# Patient Record
Sex: FEMALE | Race: WHITE | NOT HISPANIC OR LATINO | Employment: PART TIME | URBAN - METROPOLITAN AREA
[De-identification: names, ages, dates, MRNs, and addresses within clinical notes are randomized per-mention and may not be internally consistent; named-entity substitution may affect disease eponyms.]

---

## 2017-01-10 ENCOUNTER — TRANSCRIBE ORDERS (OUTPATIENT)
Dept: ADMINISTRATIVE | Facility: HOSPITAL | Age: 62
End: 2017-01-10

## 2017-01-10 DIAGNOSIS — F17.200 SMOKER: Primary | ICD-10-CM

## 2017-01-18 ENCOUNTER — HOSPITAL ENCOUNTER (OUTPATIENT)
Dept: CT IMAGING | Facility: HOSPITAL | Age: 62
Discharge: HOME | End: 2017-01-18
Payer: COMMERCIAL

## 2017-01-18 DIAGNOSIS — F17.200 SMOKER: ICD-10-CM

## 2018-01-29 ENCOUNTER — OFFICE VISIT (OUTPATIENT)
Dept: OBGYN CLINIC | Facility: CLINIC | Age: 63
End: 2018-01-29
Payer: COMMERCIAL

## 2018-01-29 VITALS
WEIGHT: 154 LBS | SYSTOLIC BLOOD PRESSURE: 120 MMHG | BODY MASS INDEX: 24.75 KG/M2 | HEIGHT: 66 IN | DIASTOLIC BLOOD PRESSURE: 80 MMHG

## 2018-01-29 DIAGNOSIS — Z01.419 ENCOUNTER FOR ANNUAL ROUTINE GYNECOLOGICAL EXAMINATION: ICD-10-CM

## 2018-01-29 DIAGNOSIS — Z01.419 ENCOUNTER FOR ANNUAL ROUTINE GYNECOLOGICAL EXAMINATION: Primary | ICD-10-CM

## 2018-01-29 PROCEDURE — G0145 SCR C/V CYTO,THINLAYER,RESCR: HCPCS | Performed by: OBSTETRICS & GYNECOLOGY

## 2018-01-29 PROCEDURE — S0612 ANNUAL GYNECOLOGICAL EXAMINA: HCPCS | Performed by: OBSTETRICS & GYNECOLOGY

## 2018-01-29 PROCEDURE — 87624 HPV HI-RISK TYP POOLED RSLT: CPT | Performed by: OBSTETRICS & GYNECOLOGY

## 2018-01-29 RX ORDER — ALPRAZOLAM 0.25 MG/1
1 TABLET ORAL
COMMUNITY
Start: 2012-10-02

## 2018-01-29 RX ORDER — BUPROPION HYDROCHLORIDE 150 MG/1
1 TABLET ORAL
COMMUNITY
Start: 2011-09-19

## 2018-01-29 RX ORDER — ALENDRONATE SODIUM 70 MG/1
1 TABLET ORAL WEEKLY
COMMUNITY
Start: 2012-05-22

## 2018-01-29 NOTE — PROGRESS NOTES
Assessment/Plan:    Pap smear done with HPV  Encouraged self-breast examination as well as calcium supplementation  Recommend annual mammogram, discussed 3D mammography given breast density  She will check to see if this is covered by her insurance  She will also make arrangements for screening colonoscopy  She will continue to follow-up with her primary care physician as scheduled  I also recommend couples/individual counseling  Patient is not agreeable to this at this time  Also recommend decreased tobacco use  She will return to office in 1 year or p r n  No problem-specific Assessment & Plan notes found for this encounter  Diagnoses and all orders for this visit:    Encounter for annual routine gynecological examination    Other orders  -     alendronate (FOSAMAX) 70 mg tablet; Take 1 tablet by mouth once a week  -     ALPRAZolam (XANAX) 0 25 mg tablet; Take 1 tablet by mouth  -     buPROPion (WELLBUTRIN XL) 150 mg 24 hr tablet; Take 1 tablet by mouth          Subjective:      Patient ID: Jeremi Laguna is a 63 y o  female  3 para 3 who presents for her annual gyn exam   Her last gyn exam was over 2 years ago  Patient went through menopause at age 54  She was never on hormone replacement  She denies any vaginal bleeding or spotting  She denies any changes in bowel or bladder function  Her last mammogram was over 2 years ago  She also states that she is due for colonoscopy this year  She is not taking any medication on a daily basis  She does have a long history of depression and anxiety  She does follow up with her family physician every 6 months  She has been off her antidepressants for well over 1 year  She has not seen a psychologist or psychiatrist in the last 2 years  Patient has been  for over 10 years and has not been sexually active in 10 years  She does admit to problems with their relationships  He does have a lot of medical issues also      HPI    The following portions of the patient's history were reviewed and updated as appropriate: allergies, current medications, past family history, past medical history, past social history, past surgical history and problem list     Review of Systems   Constitutional: Negative for fatigue, fever and unexpected weight change  Respiratory: Negative for cough, chest tightness, shortness of breath and wheezing  Cardiovascular: Negative  Negative for chest pain and palpitations  Gastrointestinal: Negative  Negative for abdominal distention, abdominal pain, blood in stool, constipation, diarrhea, nausea and vomiting  Genitourinary: Negative  Negative for difficulty urinating, dyspareunia, dysuria, flank pain, frequency, genital sores, hematuria, pelvic pain, urgency, vaginal bleeding, vaginal discharge and vaginal pain  Skin: Negative for rash  Objective:     Physical Exam   Constitutional: She appears well-developed and well-nourished  Cardiovascular: Normal rate, regular rhythm and normal heart sounds  Pulmonary/Chest: Effort normal and breath sounds normal    Abdominal: Soft  Bowel sounds are normal  She exhibits no distension  There is no tenderness  There is no rebound and no guarding  Genitourinary: Rectum normal, vagina normal and uterus normal  No breast swelling, tenderness or discharge  There is no tenderness or lesion on the right labia  There is no tenderness or lesion on the left labia  Cervix exhibits no motion tenderness and no discharge  Right adnexum displays no mass, no tenderness and no fullness  Left adnexum displays no mass, no tenderness and no fullness  No tenderness or bleeding in the vagina  No vaginal discharge found

## 2018-02-02 LAB
HPV RRNA GENITAL QL NAA+PROBE: NORMAL
LAB AP GYN PRIMARY INTERPRETATION: NORMAL
Lab: NORMAL

## 2020-02-17 NOTE — ADDENDUM NOTE
Addended by: Jyoti Cox on: 1/29/2018 03:09 PM     Modules accepted: Orders 10/20/2017 Rhomboid Transposition Flap Text: The defect edges were debeveled with a #15 scalpel blade.  Given the location of the defect and the proximity to free margins a rhomboid transposition flap was deemed most appropriate.  Using a sterile surgical marker, an appropriate rhomboid flap was drawn incorporating the defect.    The area thus outlined was incised deep to adipose tissue with a #15 scalpel blade.  The skin margins were undermined to an appropriate distance in all directions utilizing iris scissors.

## 2021-02-15 ENCOUNTER — TRANSCRIBE ORDERS (OUTPATIENT)
Dept: PHYSICAL THERAPY | Facility: CLINIC | Age: 66
End: 2021-02-15

## 2021-02-23 ENCOUNTER — EVALUATION (OUTPATIENT)
Dept: PHYSICAL THERAPY | Facility: CLINIC | Age: 66
End: 2021-02-23
Payer: COMMERCIAL

## 2021-02-23 ENCOUNTER — TRANSCRIBE ORDERS (OUTPATIENT)
Dept: PHYSICAL THERAPY | Facility: CLINIC | Age: 66
End: 2021-02-23

## 2021-02-23 DIAGNOSIS — M25.511 RIGHT SHOULDER PAIN, UNSPECIFIED CHRONICITY: Primary | ICD-10-CM

## 2021-02-23 PROCEDURE — 97140 MANUAL THERAPY 1/> REGIONS: CPT | Performed by: PHYSICAL THERAPIST

## 2021-02-23 PROCEDURE — 97110 THERAPEUTIC EXERCISES: CPT | Performed by: PHYSICAL THERAPIST

## 2021-02-23 PROCEDURE — 97161 PT EVAL LOW COMPLEX 20 MIN: CPT | Performed by: PHYSICAL THERAPIST

## 2021-02-23 NOTE — LETTER
2021    Pb Morales MD  [address] [facility] [address]    Patient: Forest Agee   YOB: 1955   Date of Visit: 2021     Encounter Diagnosis     ICD-10-CM    1  Right shoulder pain, unspecified chronicity  M25 511        Dear Dr Mcgovern Reading:    Thank you for your recent referral of Forest Agee  Please review the attached evaluation summary from Kendal's recent visit  Please verify that you agree with the plan of care by signing the attached order  If you have any questions or concerns, please do not hesitate to call  I sincerely appreciate the opportunity to share in the care of one of your patients and hope to have another opportunity to work with you in the near future  Sincerely,    Andres Herr, PT      Referring Provider:      I certify that I have read the below Plan of Care and certify the need for these services furnished under this plan of treatment while under my care  Pb Morales MD  [address]  Via Fax: [phone]          PT Evaluation     Today's date: 2021  Patient name: Forest Agee  : 1955  MRN: [id number]  Referring provider: Ralph Sanchez*  Dx:   Encounter Diagnosis     ICD-10-CM    1  Right shoulder pain, unspecified chronicity  M25 511                   Assessment  Assessment details: Forest Agee is a pleasant 63 y o  female presents s/p L GH capsule lysis and manipulation  Pt with significant improvements in PROM since surgery considering her subjective limitations prior  Pain was well managed today  Educated on 30 minute bouts of supine stretching to increase TERT  Educated on working on motion through pain to maintain the improvements seen since surgery  Will initiate AAROM nv  No further referral appears necessary at this time     Skilled PT services appropriate to facilitate return to prior level of function with transition to home exercise program for independent management when appropriate  Impairments: abnormal muscle firing, abnormal muscle tone, abnormal or restricted ROM, impaired physical strength, lacks appropriate home exercise program and pain with function  Understanding of Dx/Px/POC: good   Prognosis: good    Goals  Patient will successfully transition to home exercise program   Patient will be able to manage symptoms independently  LT  foto to predicted in 8 weeks  2  Shoulder P/AROM WFL within 8 weeks  ST  Shoulder flexion ROM to 170* within 3 weeks  2  Shoulder AROM flexion to 120* with appropriate form within 4 weeks  Plan  Patient would benefit from: skilled PT  Referral necessary: No  Planned modality interventions: thermotherapy: hydrocollator packs  Planned therapy interventions: home exercise program, manual therapy, neuromuscular re-education, patient education, functional ROM exercises, strengthening, stretching, joint mobilization, graded activity, graded exercise, therapeutic exercise, body mechanics training, motor coordination training and activity modification  Frequency: 2x week  Duration in weeks: 12  Treatment plan discussed with: patient        Subjective Evaluation    History of Present Illness  Date of onset: 2021  Mechanism of injury: Pt presents 1 day s/p manipulation and capsular release of R shoulder  She reports for 1 year struggling highly with shoulder mobility and pain  She attempted PT for a short time and injections with minimal change in motion so opted for surgery  She does have local numbness surrounding the shoulder still and has taken pain medication prior to PT with sxs better managed  She would like to improve on her shoulder ROM and function  In positions away from her body    Pain  Current pain ratin  At best pain ratin  At worst pain ratin  Quality: dull ache and sharp    Treatments  Current treatment: physical therapy        Objective     Tenderness Additional Tenderness Details  Minimal cervical musculature trigger points/spasm    Neurological Testing     Sensation     Shoulder   Left Shoulder   Intact: light touch    Right Shoulder   Intact: light touch    Additional Neurological Details  Decreased sensation surrounding shoulder and equal b/l from middle third of arm to fingers    Passive Range of Motion   Left Shoulder   Flexion: 150 degrees   External rotation 90°: 70 degrees   Internal rotation 0°: 30 degrees     Additional Passive Range of Motion Details  Mild pain all motions but able to hold in end range for prolonged time frame    General Comments:      Shoulder Comments   AROM elbow limited today to 0-40* with full PROM and did not assess AROM shoulder   strength WFL      Flowsheet Rows      Most Recent Value   PT/OT G-Codes   Current Score  19   Projected Score  56             Precautions: none    HEP: 30min TERT supine exercises ea  Manuals 2/23            Shoulder PROM  CB                                                   Neuro Re-Ed                                                                                                        Ther Ex             TERT supine Flexion  ER w/abd 90* and 45* 10'              Table slides             ER table stretch             pulleys                                                                 Ther Activity                                       Gait Training                                       Modalities

## 2021-02-23 NOTE — PROGRESS NOTES
PT Evaluation     Today's date: 2021  Patient name: Elmo Duncan  : 1955  MRN: [id number]  Referring provider: Roberto Simon*  Dx:   Encounter Diagnosis     ICD-10-CM    1  Right shoulder pain, unspecified chronicity  M25 511                   Assessment  Assessment details: Elmo Duncan is a pleasant 63 y o  female presents s/p L GH capsule lysis and manipulation  Pt with significant improvements in PROM since surgery considering her subjective limitations prior  Pain was well managed today  Educated on 30 minute bouts of supine stretching to increase TERT  Educated on working on motion through pain to maintain the improvements seen since surgery  Will initiate AAROM nv  No further referral appears necessary at this time  Skilled PT services appropriate to facilitate return to prior level of function with transition to home exercise program for independent management when appropriate  Impairments: abnormal muscle firing, abnormal muscle tone, abnormal or restricted ROM, impaired physical strength, lacks appropriate home exercise program and pain with function  Understanding of Dx/Px/POC: good   Prognosis: good    Goals  Patient will successfully transition to home exercise program   Patient will be able to manage symptoms independently  LT  foto to predicted in 8 weeks  2  Shoulder P/AROM WFL within 8 weeks  ST  Shoulder flexion ROM to 170* within 3 weeks  2  Shoulder AROM flexion to 120* with appropriate form within 4 weeks        Plan  Patient would benefit from: skilled PT  Referral necessary: No  Planned modality interventions: thermotherapy: hydrocollator packs  Planned therapy interventions: home exercise program, manual therapy, neuromuscular re-education, patient education, functional ROM exercises, strengthening, stretching, joint mobilization, graded activity, graded exercise, therapeutic exercise, body mechanics training, motor coordination training and activity modification  Frequency: 2x week  Duration in weeks: 12  Treatment plan discussed with: patient        Subjective Evaluation    History of Present Illness  Date of onset: 2021  Mechanism of injury: Pt presents 1 day s/p manipulation and capsular release of R shoulder  She reports for 1 year struggling highly with shoulder mobility and pain  She attempted PT for a short time and injections with minimal change in motion so opted for surgery  She does have local numbness surrounding the shoulder still and has taken pain medication prior to PT with sxs better managed  She would like to improve on her shoulder ROM and function  In positions away from her body    Pain  Current pain ratin  At best pain ratin  At worst pain ratin  Quality: dull ache and sharp    Treatments  Current treatment: physical therapy        Objective     Tenderness     Additional Tenderness Details  Minimal cervical musculature trigger points/spasm    Neurological Testing     Sensation     Shoulder   Left Shoulder   Intact: light touch    Right Shoulder   Intact: light touch    Additional Neurological Details  Decreased sensation surrounding shoulder and equal b/l from middle third of arm to fingers    Passive Range of Motion   Left Shoulder   Flexion: 150 degrees   External rotation 90°: 70 degrees   Internal rotation 0°: 30 degrees     Additional Passive Range of Motion Details  Mild pain all motions but able to hold in end range for prolonged time frame    General Comments:      Shoulder Comments   AROM elbow limited today to 0-40* with full PROM and did not assess AROM shoulder   strength WFL      Flowsheet Rows      Most Recent Value   PT/OT G-Codes   Current Score  19   Projected Score  56             Precautions: none    HEP: 30min TERT supine exercises ea  Manuals             Shoulder PROM  CB                                                   Neuro Re-Ed Ther Ex             TERT supine Flexion  ER w/abd 90* and 45* 10'              Table slides             ER table stretch             pulleys                                                                 Ther Activity                                       Gait Training                                       Modalities

## 2021-02-24 ENCOUNTER — OFFICE VISIT (OUTPATIENT)
Dept: PHYSICAL THERAPY | Facility: CLINIC | Age: 66
End: 2021-02-24
Payer: COMMERCIAL

## 2021-02-24 DIAGNOSIS — M25.511 RIGHT SHOULDER PAIN, UNSPECIFIED CHRONICITY: Primary | ICD-10-CM

## 2021-02-24 PROCEDURE — 97140 MANUAL THERAPY 1/> REGIONS: CPT | Performed by: PHYSICAL THERAPIST

## 2021-02-24 PROCEDURE — 97112 NEUROMUSCULAR REEDUCATION: CPT | Performed by: PHYSICAL THERAPIST

## 2021-02-24 PROCEDURE — 97110 THERAPEUTIC EXERCISES: CPT | Performed by: PHYSICAL THERAPIST

## 2021-02-24 NOTE — PROGRESS NOTES
Daily Note     Today's date: 2021  Patient name: Bridgett Sotelo  : 1955  MRN: [id number]  Referring provider: Melissa Corrales*  Dx:   Encounter Diagnosis     ICD-10-CM    1  Right shoulder pain, unspecified chronicity  M25 511                   Subjective: Reports some soreness but compliance with her HEP  Objective: See treatment diary below      Assessment: Tolerated treatment well  Patient would benefit from continued PT      Plan: Progress treatment as tolerated         Precautions: none    HEP: 30min TERT supine exercises ea  Manuals            Shoulder PROM  CB Nd           STM to axilla  ND           Inferior and posterior  GH joint mobs  ND                        Neuro Re-Ed             Upper thoracic extesion stretch over pillow fold with LTR  5'/  20x           pulleys  10x10"           Supine posterior capsule stretch  5x30"                                                               Ther Ex             TERT supine Flexion  ER w/abd 90* and 45* 10'   home           Table slides  NV           ER table stretch  NV                                                                            Ther Activity                                       Gait Training                                       Modalities

## 2021-02-25 ENCOUNTER — APPOINTMENT (OUTPATIENT)
Dept: PHYSICAL THERAPY | Facility: CLINIC | Age: 66
End: 2021-02-25
Payer: COMMERCIAL

## 2021-02-26 ENCOUNTER — OFFICE VISIT (OUTPATIENT)
Dept: PHYSICAL THERAPY | Facility: CLINIC | Age: 66
End: 2021-02-26
Payer: COMMERCIAL

## 2021-02-26 DIAGNOSIS — M25.511 RIGHT SHOULDER PAIN, UNSPECIFIED CHRONICITY: Primary | ICD-10-CM

## 2021-02-26 PROCEDURE — 97112 NEUROMUSCULAR REEDUCATION: CPT | Performed by: PHYSICAL THERAPIST

## 2021-02-26 PROCEDURE — 97140 MANUAL THERAPY 1/> REGIONS: CPT | Performed by: PHYSICAL THERAPIST

## 2021-02-26 PROCEDURE — 97110 THERAPEUTIC EXERCISES: CPT | Performed by: PHYSICAL THERAPIST

## 2021-03-01 ENCOUNTER — OFFICE VISIT (OUTPATIENT)
Dept: PHYSICAL THERAPY | Facility: CLINIC | Age: 66
End: 2021-03-01
Payer: COMMERCIAL

## 2021-03-01 DIAGNOSIS — M25.511 RIGHT SHOULDER PAIN, UNSPECIFIED CHRONICITY: Primary | ICD-10-CM

## 2021-03-01 PROCEDURE — 97110 THERAPEUTIC EXERCISES: CPT | Performed by: PHYSICAL THERAPIST

## 2021-03-01 PROCEDURE — 97112 NEUROMUSCULAR REEDUCATION: CPT | Performed by: PHYSICAL THERAPIST

## 2021-03-01 PROCEDURE — 97140 MANUAL THERAPY 1/> REGIONS: CPT | Performed by: PHYSICAL THERAPIST

## 2021-03-01 NOTE — PROGRESS NOTES
Daily Note     Today's date: 3/1/2021  Patient name: Titus Mathur  : 1955  MRN: [id number]  Referring provider: Edenilson Carlos*  Dx:   Encounter Diagnosis     ICD-10-CM    1  Right shoulder pain, unspecified chronicity  M25 511                   Subjective: Reports that Naproxen has been helping      Objective: See treatment diary below      Assessment: Tolerated treatment well  Patient would benefit from continued PT  Updated Hep and advised to perform 5x per day or more    Plan: Progress treatment as tolerated         Precautions: none      Manuals 2/23 2/24 2/26 3/1         Shoulder PROM  CB Nd ND ND         STM to axilla  ND ND ND         Inferior and posterior  GH joint mobs  ND ND ND         Scapular mobs    ND         Neuro Re-Ed             Upper thoracic extesion stretch over pillow fold with LTR  5'/  20x ND ND f/b AROm wand flexion 10x         pulleys  10x10" ND 20x10"         Supine posterior capsule stretch  5x30" ND          Table slide flexion   20x10" ND         Table slide ER   20x10" ND                                   Ther Ex             TERT supine Flexion  ER w/abd 90* and 45* 10'   home                                                                                                      Ther Activity                                       Gait Training                                       Modalities

## 2021-03-02 ENCOUNTER — OFFICE VISIT (OUTPATIENT)
Dept: PHYSICAL THERAPY | Facility: CLINIC | Age: 66
End: 2021-03-02
Payer: COMMERCIAL

## 2021-03-02 DIAGNOSIS — M25.511 RIGHT SHOULDER PAIN, UNSPECIFIED CHRONICITY: Primary | ICD-10-CM

## 2021-03-02 PROCEDURE — 97112 NEUROMUSCULAR REEDUCATION: CPT | Performed by: PHYSICAL THERAPIST

## 2021-03-02 PROCEDURE — 97140 MANUAL THERAPY 1/> REGIONS: CPT | Performed by: PHYSICAL THERAPIST

## 2021-03-02 PROCEDURE — 97110 THERAPEUTIC EXERCISES: CPT | Performed by: PHYSICAL THERAPIST

## 2021-03-02 NOTE — PROGRESS NOTES
Daily Note     Today's date: 3/2/2021  Patient name: Gisela Haskins  : 1955  MRN: [id number]  Referring provider: Buzz Goldmann*  Dx:   Encounter Diagnosis     ICD-10-CM    1  Right shoulder pain, unspecified chronicity  M25 511                   Subjective: Reports that her shoulder mobility is improving and she felt much better after yesterday's session  Objective: See treatment diary below    Assessment: Tolerated treatment well  Patient would benefit from continued PT  Ismael Zhu Plan: Progress treatment as tolerated         Precautions: none      Manuals 2/23 2/24 2/26 3/1 3/2        Shoulder PROM  CB Nd ND ND ND        STM to axilla  ND ND ND ND        Inferior and posterior  GH joint mobs  ND ND ND ND        Scapular mobs    ND ND        Neuro Re-Ed             Upper thoracic extesion stretch over pillow fold with LTR  5'/  20x ND ND f/b AROm wand flexion 10x ND        pulleys  10x10" ND 20x10" ND        Supine posterior capsule stretch  5x30" ND          Table slide flexion   20x10" ND ND        Table slide ER   20x10" ND ND                                  Ther Ex             TERT supine Flexion  ER w/abd 90* and 45* 10'   home                                                                                                      Ther Activity                                       Gait Training                                       Modalities

## 2021-03-04 ENCOUNTER — OFFICE VISIT (OUTPATIENT)
Dept: PHYSICAL THERAPY | Facility: CLINIC | Age: 66
End: 2021-03-04
Payer: COMMERCIAL

## 2021-03-04 DIAGNOSIS — M25.511 RIGHT SHOULDER PAIN, UNSPECIFIED CHRONICITY: Primary | ICD-10-CM

## 2021-03-04 PROCEDURE — 97110 THERAPEUTIC EXERCISES: CPT | Performed by: PHYSICAL THERAPIST

## 2021-03-04 PROCEDURE — 97140 MANUAL THERAPY 1/> REGIONS: CPT | Performed by: PHYSICAL THERAPIST

## 2021-03-04 PROCEDURE — 97112 NEUROMUSCULAR REEDUCATION: CPT | Performed by: PHYSICAL THERAPIST

## 2021-03-04 NOTE — PROGRESS NOTES
Daily Note     Today's date: 3/4/2021  Patient name: Elmo Duncan  : 1955  MRN: [id number]  Referring provider: Roberto Simon*  Dx:   Encounter Diagnosis     ICD-10-CM    1  Right shoulder pain, unspecified chronicity  M25 511                   Subjective: Reports increased soreness after has MD appointment  MD recommended continued PT 3x per week and will follow up in 4 weeks    Objective: See treatment diary below    Assessment: Tolerated treatment well  Patient would benefit from continued PT  Improved AROM functional IR post tx  She is having pain with eccentric shoulder elevation  Plan: Progress treatment as tolerated         Precautions: none      Manuals 2/23 2/24 2/26 3/1 3/2 3/4       Shoulder PROM  CB Nd ND ND ND ND       STM to axilla  ND ND ND ND ND       Inferior and posterior  GH joint mobs  ND ND ND ND ND       Scapular mobs    ND ND ND       Neuro Re-Ed             Upper thoracic extesion stretch over pillow fold with LTR  5'/  20x ND ND f/b AROm wand flexion 10x ND ND 20x       pulleys  10x10" ND 20x10" ND home       Supine posterior capsule stretch  5x30" ND          Table slide flexion   20x10" ND ND ND       Table slide ER   20x10" ND ND ND       Wall ER stretch      5x30"                    Ther Ex             TERT supine Flexion  ER w/abd 90* and 45* 10'   home                                                                                                      Ther Activity                                       Gait Training                                       Modalities

## 2021-03-08 ENCOUNTER — OFFICE VISIT (OUTPATIENT)
Dept: PHYSICAL THERAPY | Facility: CLINIC | Age: 66
End: 2021-03-08
Payer: COMMERCIAL

## 2021-03-08 DIAGNOSIS — M25.511 RIGHT SHOULDER PAIN, UNSPECIFIED CHRONICITY: Primary | ICD-10-CM

## 2021-03-08 PROCEDURE — 97112 NEUROMUSCULAR REEDUCATION: CPT | Performed by: PHYSICAL THERAPIST

## 2021-03-08 PROCEDURE — 97110 THERAPEUTIC EXERCISES: CPT | Performed by: PHYSICAL THERAPIST

## 2021-03-08 PROCEDURE — 97140 MANUAL THERAPY 1/> REGIONS: CPT | Performed by: PHYSICAL THERAPIST

## 2021-03-08 NOTE — PROGRESS NOTES
Daily Note     Today's date: 3/8/2021  Patient name: Maren Cruz  : 1955  MRN: [id number]  Referring provider: Hyun Gutierrez*  Dx:   Encounter Diagnosis     ICD-10-CM    1  Right shoulder pain, unspecified chronicity  M25 511                   Subjective: Reports continued soreness/pain in right shoulder  States she has been taking naproxen and oxycodone to reduce symptoms  Objective: See treatment diary below  Assessment: Tolerated treatment well  Cont'd restriction in R shoulder capsule most notably with inferior/posterior GH mobilizations  Decreased pain and improved ROM noted post-tx  Patient would benefit from continued PT  Plan: Progress treatment as tolerated         Precautions: none      Manuals 2/23 2/24 2/26 3/1 3/2 3/4 3/8      Shoulder PROM  CB Nd ND ND ND ND KF      STM to axilla  ND ND ND ND ND KF      Inferior and posterior  GH joint mobs  ND ND ND ND ND KF      Scapular mobs    ND ND ND KF      Neuro Re-Ed             Upper thoracic extesion stretch over pillow fold with LTR  5'/  20x ND ND f/b AROm wand flexion 10x ND ND 20x KF      pulleys  10x10" ND 20x10" ND home home      Supine posterior capsule stretch  5x30" ND          Table slide flexion   20x10" ND ND ND KF      Table slide ER   20x10" ND ND ND KF      Wall ER stretch      5x30" :30x5      inferior mobs with belt and AAROM OH        NV                                Ther Ex             TERT supine Flexion  ER w/abd 90* and 45* 10'   home                                                                                                      Ther Activity                                       Gait Training                                       Modalities

## 2021-03-10 ENCOUNTER — OFFICE VISIT (OUTPATIENT)
Dept: PHYSICAL THERAPY | Facility: CLINIC | Age: 66
End: 2021-03-10
Payer: COMMERCIAL

## 2021-03-10 DIAGNOSIS — M25.511 RIGHT SHOULDER PAIN, UNSPECIFIED CHRONICITY: Primary | ICD-10-CM

## 2021-03-10 PROCEDURE — 97140 MANUAL THERAPY 1/> REGIONS: CPT | Performed by: PHYSICAL THERAPIST

## 2021-03-10 PROCEDURE — 97112 NEUROMUSCULAR REEDUCATION: CPT | Performed by: PHYSICAL THERAPIST

## 2021-03-10 PROCEDURE — 97110 THERAPEUTIC EXERCISES: CPT | Performed by: PHYSICAL THERAPIST

## 2021-03-10 NOTE — PROGRESS NOTES
Daily Note     Today's date: 3/10/2021  Patient name: Sophie Bowman  : 1955  MRN: [id number]  Referring provider: Marcio Child*  Dx:   Encounter Diagnosis     ICD-10-CM    1  Right shoulder pain, unspecified chronicity  M25 511                   Subjective: Reports feeling much better with ROM  No increased pain reported after performing cleaning at her house yesterday    Objective: See treatment diary below  Assessment: Tolerated treatment well  Patient would benefit from continued PT  Plan: Progress treatment as tolerated         Precautions: none      Manuals 2/23 2/24 2/26 3/1 3/2 3/4 3/8 3/10     Shoulder PROM  CB Nd ND ND ND ND KF ND     STM to axilla  ND ND ND ND ND KF ND     Inferior and posterior  GH joint mobs  ND ND ND ND ND KF ND     Scapular mobs    ND ND ND KF ND     Neuro Re-Ed             Upper thoracic extesion stretch over pillow fold with LTR  5'/  20x ND ND f/b AROm wand flexion 10x ND ND 20x KF ND     pulleys  10x10" ND 20x10" ND home home ND     Supine posterior capsule stretch  5x30" ND          Table slide flexion   20x10" ND ND ND KF ND     Table slide ER   20x10" ND ND ND KF ND     Wall ER stretch      5x30" :30x5      inferior mobs with belt and AAROM OH        NV NV                               Ther Ex             TERT supine Flexion  ER w/abd 90* and 45* 10'   home           scaption with scap setting        20x     S/l ER DB        2lbs 20x     tband ext and add        Green 10x10"                                                         Ther Activity                                       Gait Training                                       Modalities

## 2021-03-11 ENCOUNTER — OFFICE VISIT (OUTPATIENT)
Dept: PHYSICAL THERAPY | Facility: CLINIC | Age: 66
End: 2021-03-11
Payer: COMMERCIAL

## 2021-03-11 DIAGNOSIS — M25.511 RIGHT SHOULDER PAIN, UNSPECIFIED CHRONICITY: Primary | ICD-10-CM

## 2021-03-11 PROCEDURE — 97112 NEUROMUSCULAR REEDUCATION: CPT | Performed by: PHYSICAL THERAPIST

## 2021-03-11 PROCEDURE — 97110 THERAPEUTIC EXERCISES: CPT | Performed by: PHYSICAL THERAPIST

## 2021-03-11 PROCEDURE — 97140 MANUAL THERAPY 1/> REGIONS: CPT | Performed by: PHYSICAL THERAPIST

## 2021-03-11 NOTE — PROGRESS NOTES
Daily Note     Today's date: 3/11/2021  Patient name: Felix Nance  : 1955  MRN: [id number]  Referring provider: Kristofer Vega*  Dx:   Encounter Diagnosis     ICD-10-CM    1  Right shoulder pain, unspecified chronicity  M25 511                   Subjective: Reports returned to PCP and was prescribed two different pain medications to help manage symptoms  States no adverse effects to PT session progression yesterday, stating difficulty with seated AROM ex  Reports she continues to notice improvements in range of motion overall and is pleased with progress thus far  Objective: See treatment diary below  Assessment: Tolerated treatment well  Good tolerance to manuals with capsular mobility gradually improving  Good tolerance to current ex program, less difficulty reported with seated scaption ex this visit  Patient would benefit from continued PT  Plan: Progress treatment as tolerated         Precautions: none      Manuals 2/23 2/24 2/26 3/1 3/2 3/4 3/8 3/10 3/11    Shoulder PROM  CB Nd ND ND ND ND KF ND KF    STM to axilla  ND ND ND ND ND KF ND KF    Inferior and posterior  GH joint mobs  ND ND ND ND ND KF ND KF    Scapular mobs    ND ND ND KF ND KF    Neuro Re-Ed             Upper thoracic extesion stretch over pillow fold with LTR  5'/  20x ND ND f/b AROm wand flexion 10x ND ND 20x KF ND KF    pulleys  10x10" ND 20x10" ND home home ND KF    Supine posterior capsule stretch  5x30" ND          Table slide flexion   20x10" ND ND ND KF ND KF    Table slide ER   20x10" ND ND ND KF ND KF    Wall ER stretch      5x30" :30x5      inferior mobs with belt and AAROM OH        NV NV NV                              Ther Ex             TERT supine Flexion  ER w/abd 90* and 45* 10'   home           scaption with scap setting        20x :05x20    S/l ER DB        2lbs 20x 2lbs :05x15    tband ext and add        Green 10x10" Green 10x10"                                                        Ther Activity                                       Gait Training                                       Modalities

## 2021-03-15 ENCOUNTER — OFFICE VISIT (OUTPATIENT)
Dept: PHYSICAL THERAPY | Facility: CLINIC | Age: 66
End: 2021-03-15
Payer: COMMERCIAL

## 2021-03-15 DIAGNOSIS — M25.511 RIGHT SHOULDER PAIN, UNSPECIFIED CHRONICITY: Primary | ICD-10-CM

## 2021-03-15 PROCEDURE — 97140 MANUAL THERAPY 1/> REGIONS: CPT | Performed by: PHYSICAL THERAPIST

## 2021-03-15 PROCEDURE — 97112 NEUROMUSCULAR REEDUCATION: CPT | Performed by: PHYSICAL THERAPIST

## 2021-03-15 PROCEDURE — 97110 THERAPEUTIC EXERCISES: CPT | Performed by: PHYSICAL THERAPIST

## 2021-03-15 NOTE — PROGRESS NOTES
Daily Note     Today's date: 3/15/2021  Patient name: Dai Peguero  : 1955  MRN: [id number]  Referring provider: Naomy Ayala*  Dx:   Encounter Diagnosis     ICD-10-CM    1  Right shoulder pain, unspecified chronicity  M25 511                   Subjective: Reports continued pain despite improve ROM  Objective: See treatment diary below  Assessment: Tolerated treatment well  Patient would benefit from continued PT  Plan: Progress treatment as tolerated         Precautions: none      Manuals 2/23 2/24 2/26 3/1 3/2 3/4 3/8 3/10 3/11 3/15   Shoulder PROM  CB Nd ND ND ND ND KF ND KF ND   STM to axilla  ND ND ND ND ND KF ND KF Nd   Inferior and posterior  GH joint mobs  ND ND ND ND ND KF ND KF ND   Scapular mobs    ND ND ND KF ND KF ND   Neuro Re-Ed             Upper thoracic extesion stretch over pillow fold with LTR  5'/  20x ND ND f/b AROm wand flexion 10x ND ND 20x KF ND KF ND   pulleys  10x10" ND 20x10" ND home home ND KF ND   Supine posterior capsule stretch  5x30" ND          Table slide flexion   20x10" ND ND ND KF ND KF ND   Table slide ER   20x10" ND ND ND KF ND KF ND   Wall ER stretch      5x30" :30x5      inferior mobs with belt and AAROM OH        NV NV NV NV                             Ther Ex             TERT supine Flexion  ER w/abd 90* and 45* 10'   home           scaption with scap setting        20x :05x20 ND   S/l ER DB        2lbs 20x 2lbs :05x15 3lbs 20x   tband ext and add        Green 10x10" Green 10x10" ND   Towel functional IR stretch          3x30"                                          Ther Activity                                       Gait Training                                       Modalities

## 2021-03-17 ENCOUNTER — OFFICE VISIT (OUTPATIENT)
Dept: PHYSICAL THERAPY | Facility: CLINIC | Age: 66
End: 2021-03-17
Payer: COMMERCIAL

## 2021-03-17 DIAGNOSIS — M25.511 RIGHT SHOULDER PAIN, UNSPECIFIED CHRONICITY: Primary | ICD-10-CM

## 2021-03-17 PROCEDURE — 97110 THERAPEUTIC EXERCISES: CPT | Performed by: PHYSICAL THERAPIST

## 2021-03-17 PROCEDURE — 97140 MANUAL THERAPY 1/> REGIONS: CPT | Performed by: PHYSICAL THERAPIST

## 2021-03-17 PROCEDURE — 97112 NEUROMUSCULAR REEDUCATION: CPT | Performed by: PHYSICAL THERAPIST

## 2021-03-17 NOTE — PROGRESS NOTES
Daily Note     Today's date: 3/17/2021  Patient name: Dai Peguero  : 1955  MRN: [id number]  Referring provider: Naomy Ayala*  Dx:   Encounter Diagnosis     ICD-10-CM    1  Right shoulder pain, unspecified chronicity  M25 511                   Subjective: Kendal reports significant increase in mobility and decrease in pain following her last PT session  Objective: See treatment diary below      Assessment: Tolerated treatment well and continues to demonstrate increased mobility following MT   exercises today focused on low trap activation to reduce shoudler hiking during active shoulder flexion    Patient demonstrated fatigue post treatment, exhibited good technique with therapeutic exercises and would benefit from continued PT      Plan: Continue per plan of care  Progress treatment as tolerated         Precautions: none      Manuals 3/17   3/1 3/2 3/4 3/8 3/10 3/11 3/15   Shoulder PROM  JL   ND ND ND KF ND KF ND   STM to axilla JL   ND ND ND KF ND KF Nd   Inferior and posterior  GH joint mobs JL   ND ND ND KF ND KF ND   Scapular mobs JL   ND ND ND KF ND KF ND   Neuro Re-Ed             Upper thoracic extesion stretch over pillow fold with LTR JL   ND f/b AROm wand flexion 10x ND ND 20x KF ND KF ND   pulleys JL   20x10" ND home home ND KF ND   Supine posterior capsule stretch             Table slide flexion    ND ND ND KF ND KF ND   Table slide ER    ND ND ND KF ND KF ND   Wall ER stretch      5x30" :30x5      inferior mobs with belt and AAROM OH        NV NV NV NV   Wall subscap stretch OH JL            Seated low trap dip JL            Ther Ex             TERT supine             scaption with scap setting        20x :05x20 ND   S/l ER DB        2lbs 20x 2lbs :05x15 3lbs 20x   tband ext and add JL       Green 10x10" Green 10x10" ND   Towel functional IR stretch JL         3x30"                                          Ther Activity                                       Gait Training Modalities

## 2021-03-19 ENCOUNTER — OFFICE VISIT (OUTPATIENT)
Dept: PHYSICAL THERAPY | Facility: CLINIC | Age: 66
End: 2021-03-19
Payer: COMMERCIAL

## 2021-03-19 DIAGNOSIS — M25.511 RIGHT SHOULDER PAIN, UNSPECIFIED CHRONICITY: Primary | ICD-10-CM

## 2021-03-19 PROCEDURE — 97140 MANUAL THERAPY 1/> REGIONS: CPT | Performed by: PHYSICAL THERAPIST

## 2021-03-19 PROCEDURE — 97112 NEUROMUSCULAR REEDUCATION: CPT | Performed by: PHYSICAL THERAPIST

## 2021-03-19 PROCEDURE — 97110 THERAPEUTIC EXERCISES: CPT | Performed by: PHYSICAL THERAPIST

## 2021-03-19 NOTE — PROGRESS NOTES
Daily Note     Today's date: 3/19/2021  Patient name: Raymundo Murillo  : 1955  MRN: [id number]  Referring provider: Osiris Hernández*  Dx:   Encounter Diagnosis     ICD-10-CM    1  Right shoulder pain, unspecified chronicity  M25 511                   Subjective: Reports improved ROM but increased soreness  Objective: See treatment diary below      Assessment: She is demonstrating improved ROM, flexibility and strength each week  Plan: Continue per plan of care  Progress treatment as tolerated         Precautions: none      Manuals 3/17 3/19  3/1 3/2 3/4 3/8 3/10 3/11 3/15   Shoulder PROM  JL Nd  ND ND ND KF ND KF ND   STM to axilla JL Nd  ND ND ND KF ND KF Nd   Inferior and posterior  GH joint mobs JL Nd  ND ND ND KF ND KF ND   Scapular mobs JL ND  ND ND ND KF ND KF ND   Neuro Re-Ed             Upper thoracic extesion stretch over pillow fold with LTR JL ND  ND f/b AROm wand flexion 10x ND ND 20x KF ND KF ND   pulleys JL ND  20x10" ND home home ND KF ND   Supine posterior capsule stretch             Table slide flexion  Nd  ND ND ND KF ND KF ND   Table slide ER  ND  ND ND ND KF ND KF ND   Wall ER stretch      5x30" :30x5      inferior mobs with belt and AAROM OH        NV NV NV NV   Wall subscap stretch OH JL            Seated low trap dip JL            Ther Ex             TERT supine             scaption with scap setting  ND      20x :05x20 ND   S/l ER DB  ND      2lbs 20x 2lbs :05x15 3lbs 20x   tband ext and add JL Nd      Green 10x10" Green 10x10" ND   Towel functional IR stretch JL ND        3x30"                                          Ther Activity                                       Gait Training                                       Modalities

## 2021-03-22 ENCOUNTER — OFFICE VISIT (OUTPATIENT)
Dept: PHYSICAL THERAPY | Facility: CLINIC | Age: 66
End: 2021-03-22
Payer: COMMERCIAL

## 2021-03-22 DIAGNOSIS — M25.511 RIGHT SHOULDER PAIN, UNSPECIFIED CHRONICITY: Primary | ICD-10-CM

## 2021-03-22 PROCEDURE — 97112 NEUROMUSCULAR REEDUCATION: CPT | Performed by: PHYSICAL THERAPIST

## 2021-03-22 PROCEDURE — 97110 THERAPEUTIC EXERCISES: CPT | Performed by: PHYSICAL THERAPIST

## 2021-03-22 PROCEDURE — 97140 MANUAL THERAPY 1/> REGIONS: CPT | Performed by: PHYSICAL THERAPIST

## 2021-03-22 NOTE — PROGRESS NOTES
Daily Note     Today's date: 3/22/2021  Patient name: Monique Warren  : 1955  MRN: [id number]  Referring provider: Alyssia Chadwick*  Dx:   Encounter Diagnosis     ICD-10-CM    1  Right shoulder pain, unspecified chronicity  M25 511                   Subjective: Reports increased lateral upper arm discomfort  Reports doing yardwork, raking and cleaning this weekend  Objective: See treatment diary below      Assessment: She is demonstrating improved ROM, flexibility and strength each week  Plan: Continue per plan of care  Progress treatment as tolerated         Precautions: none      Manuals 3/17 3/19 3/22          Shoulder PROM  JL Nd ND          STM to axilla JL Nd Nd          Inferior and posterior  GH joint mobs JL Nd ND          Scapular mobs JL ND ND          Neuro Re-Ed             Upper thoracic extesion stretch over pillow fold with LTR JL ND 5' f/b 10x10"          pulleys JL ND 20x10"          Supine posterior capsule stretch   5x30"          Table slide flexion  Nd 10x10"          Table slide ER  ND 5x20"          Wall ER stretch                          Wall subscap stretch OH JL            Seated low trap dip JL            Ther Ex             TERT supine             scaption with scap setting  ND 10x10"          S/l ER DB  ND ND  3lbs 20x5"          tband ext and add JL Nd Blue 10x10"          Towel functional IR stretch JL ND 10x10"          tband ER    Red 10x10"                                     Ther Activity                                       Gait Training                                       Modalities

## 2021-03-24 ENCOUNTER — OFFICE VISIT (OUTPATIENT)
Dept: PHYSICAL THERAPY | Facility: CLINIC | Age: 66
End: 2021-03-24
Payer: COMMERCIAL

## 2021-03-24 DIAGNOSIS — M25.511 RIGHT SHOULDER PAIN, UNSPECIFIED CHRONICITY: Primary | ICD-10-CM

## 2021-03-24 PROCEDURE — 97140 MANUAL THERAPY 1/> REGIONS: CPT | Performed by: PHYSICAL THERAPIST

## 2021-03-24 PROCEDURE — 97110 THERAPEUTIC EXERCISES: CPT | Performed by: PHYSICAL THERAPIST

## 2021-03-24 PROCEDURE — 97112 NEUROMUSCULAR REEDUCATION: CPT | Performed by: PHYSICAL THERAPIST

## 2021-03-24 NOTE — PROGRESS NOTES
Daily Note     Today's date: 3/24/2021  Patient name: Andrew Rios  : 1955  MRN: [id number]  Referring provider: Emily Riley*  Dx:   Encounter Diagnosis     ICD-10-CM    1  Right shoulder pain, unspecified chronicity  M25 511                   Subjective: Reports feelling much better overall and doing 5 hours of yardwork yesterday  Objective: See treatment diary below      Assessment: She is demonstrating improved ROM, flexibility and strength each week  Plan: Continue per plan of care  Progress treatment as tolerated         Precautions: none      Manuals 3/17 3/19 3/22 3/24         Shoulder PROM  JL Nd ND ND         STM to axilla JL Nd Nd Nd         Inferior and posterior  GH joint mobs JL Nd ND Nd         Scapular mobs JL ND ND Nd         Neuro Re-Ed             Upper thoracic extesion stretch over pillow fold with LTR JL ND 5' f/b 10x10"          pulleys JL ND 20x10" ND         Supine posterior capsule stretch   5x30" ND         Table slide flexion  Nd 10x10" ND         Table slide ER  ND 5x20" ND         Wall ER stretch                          Wall subscap stretch OH JL            Seated low trap dip JL            Ther Ex             TERT supine             scaption with scap setting  ND 10x10" ND         S/l ER DB  ND ND  3lbs 20x5" ND         tband ext and add JL Nd Blue 10x10" ND         Towel functional IR stretch JL ND 10x10" ND         tband ER    Red 10x10"  ND                                   Ther Activity                                       Gait Training                                       Modalities

## 2021-03-26 ENCOUNTER — OFFICE VISIT (OUTPATIENT)
Dept: PHYSICAL THERAPY | Facility: CLINIC | Age: 66
End: 2021-03-26
Payer: COMMERCIAL

## 2021-03-26 DIAGNOSIS — M25.511 RIGHT SHOULDER PAIN, UNSPECIFIED CHRONICITY: Primary | ICD-10-CM

## 2021-03-26 PROCEDURE — 97110 THERAPEUTIC EXERCISES: CPT | Performed by: PHYSICAL THERAPIST

## 2021-03-26 PROCEDURE — 97112 NEUROMUSCULAR REEDUCATION: CPT | Performed by: PHYSICAL THERAPIST

## 2021-03-26 PROCEDURE — 97140 MANUAL THERAPY 1/> REGIONS: CPT | Performed by: PHYSICAL THERAPIST

## 2021-03-26 NOTE — PROGRESS NOTES
Daily Note     Today's date: 3/26/2021  Patient name: Kristin Mosley  : 1955  MRN: [id number]  Referring provider: Wilbert Castaneda*  Dx:   Encounter Diagnosis     ICD-10-CM    1  Right shoulder pain, unspecified chronicity  M25 511                   Subjective: Reports feelling occasional increase in right lateral upper arm pain       Objective: See treatment diary below    Assessment: She is demonstrating efficient scap stabilization with resisted elevation today       Plan: Continue per plan of care  Progress treatment as tolerated         Precautions: none      Manuals 3/17 3/19 3/22 3/24 3/26        Shoulder PROM  JL Nd ND ND ND        STM to axilla JL Nd Nd Nd Nd        Inferior and posterior  GH joint mobs JL Nd ND Nd Nd        Scapular mobs JL ND ND Nd Nd        Neuro Re-Ed             Upper thoracic extesion stretch over pillow fold with LTR JL ND 5' f/b 10x10"  Nd        pulleys JL ND 20x10" ND Nd        Supine posterior capsule stretch   5x30" ND Nd        Table slide flexion  Nd 10x10" ND Nd        Table slide ER  ND 5x20" ND ND        Wall ER stretch                          Wall subscap stretch OH JL            Seated low trap dip JL            Ther Ex             TERT supine             scaption with scap setting  ND 10x10" ND 2lbs 20x        S/l ER DB  ND ND  3lbs 20x5" ND ND        tband ext and add JL Nd Blue 10x10" ND ND        Towel functional IR stretch JL ND 10x10" ND         tband ER    Red 10x10"  ND Green 10x10"        Closed chain serratus punch at EOT     10x10"                     Ther Activity                                       Gait Training                                       Modalities

## 2021-03-29 ENCOUNTER — OFFICE VISIT (OUTPATIENT)
Dept: PHYSICAL THERAPY | Facility: CLINIC | Age: 66
End: 2021-03-29
Payer: COMMERCIAL

## 2021-03-29 DIAGNOSIS — M25.511 RIGHT SHOULDER PAIN, UNSPECIFIED CHRONICITY: Primary | ICD-10-CM

## 2021-03-29 PROCEDURE — 97140 MANUAL THERAPY 1/> REGIONS: CPT | Performed by: PHYSICAL THERAPIST

## 2021-03-29 PROCEDURE — 97110 THERAPEUTIC EXERCISES: CPT | Performed by: PHYSICAL THERAPIST

## 2021-03-29 PROCEDURE — 97112 NEUROMUSCULAR REEDUCATION: CPT | Performed by: PHYSICAL THERAPIST

## 2021-03-29 NOTE — PROGRESS NOTES
Daily Note     Today's date: 3/29/2021  Patient name: Jesus Chandler  : 1955  MRN: [id number]  Referring provider: Kathia Rodríguez*  Dx:   Encounter Diagnosis     ICD-10-CM    1  Right shoulder pain, unspecified chronicity  M25 511                   Subjective: Kendal reports increased anterior shoulder pain today that has been ongoing all weekend  She was busy raking in her yard and baking  Objective: See treatment diary below      Assessment: Tolerated treatment well and session slightly limited due to pain levels today  Education provided on impact of repetitive shoulder movements on inflammation and pain  She does feel better with less pain and improved mobility by end of session  Patient demonstrated fatigue post treatment, exhibited good technique with therapeutic exercises and would benefit from continued PT      Plan: Continue per plan of care  Progress treatment as tolerated         Precautions: none      Manuals 3/17 3/19 3/22 3/24 3/26 3/29       Shoulder PROM  JL Nd ND ND ND JL       STM to axilla JL Nd Nd Nd Nd JL       Inferior and posterior  GH joint mobs JL Nd ND Nd Nd JL       Scapular mobs JL ND ND Nd Nd JL       Neuro Re-Ed             Upper thoracic extesion stretch over pillow fold with LTR JL ND 5' f/b 10x10"  Nd JL       pulleys JL ND 20x10" ND Nd JL       Supine posterior capsule stretch   5x30" ND Nd        Table slide flexion  Nd 10x10" ND Nd JL       Table slide ER  ND 5x20" ND ND JL       Wall ER stretch                          Wall subscap stretch OH JL            Seated low trap dip JL            Ther Ex             TERT supine             scaption with scap setting  ND 10x10" ND 2lbs 20x JL       S/l ER DB  ND ND  3lbs 20x5" ND ND JL       tband ext and add JL Nd Blue 10x10" ND ND JL       Towel functional IR stretch JL ND 10x10" ND         tband ER    Red 10x10"  ND Green 10x10" JL       Closed chain serratus punch at EOT     10x10" 10x10" Ther Activity                                       Gait Training                                       Modalities

## 2021-03-31 ENCOUNTER — OFFICE VISIT (OUTPATIENT)
Dept: PHYSICAL THERAPY | Facility: CLINIC | Age: 66
End: 2021-03-31
Payer: COMMERCIAL

## 2021-03-31 DIAGNOSIS — M25.511 RIGHT SHOULDER PAIN, UNSPECIFIED CHRONICITY: Primary | ICD-10-CM

## 2021-03-31 PROCEDURE — 97140 MANUAL THERAPY 1/> REGIONS: CPT | Performed by: PHYSICAL THERAPIST

## 2021-03-31 PROCEDURE — 97112 NEUROMUSCULAR REEDUCATION: CPT | Performed by: PHYSICAL THERAPIST

## 2021-03-31 NOTE — PROGRESS NOTES
Daily Note     Today's date: 3/31/2021  Patient name: Zeny Light  : 1955  MRN: [id number]  Referring provider: Dulce Delatorre*  Dx:   Encounter Diagnosis     ICD-10-CM    1  Right shoulder pain, unspecified chronicity  M25 511                   Subjective: Ghazal Ramos presents some continued anterior shoulder pain today  She has been avoiding overuse of the arm  Objective: See treatment diary below      Assessment: Tolerated treatment well and able to significantly reduce pain by end of session  Education and cuing provided throughout session on scapular retraction positioning to improve [facility] alignment with overhead movement    Patient demonstrated fatigue post treatment and would benefit from continued PT      Plan: Continue per plan of care  Progress treatment as tolerated         Precautions: none      Manuals 3/17 3/19 3/22 3/24 3/26 3/29 3/31      Shoulder PROM  JL Nd ND ND ND JL JL      STM to axilla JL Nd Nd Nd Nd JL JL      Inferior and posterior  GH joint mobs JL Nd ND Nd Nd JL JL      Scapular mobs JL ND ND Nd Nd JL JL      Neuro Re-Ed             Upper thoracic extesion stretch over pillow fold with LTR JL ND 5' f/b 10x10"  Nd JL       pulleys JL ND 20x10" ND Nd JL JL      Supine posterior capsule stretch   5x30" ND Nd        Table slide flexion  Nd 10x10" ND Nd JL       Table slide ER  ND 5x20" ND ND JL       Wall ER stretch             Wall clocks       RTB 5x      Prone ITY       0# 15x                   Ther Ex             TERT supine             scaption with scap setting  ND 10x10" ND 2lbs 20x JL       S/l ER DB  ND ND  3lbs 20x5" ND ND JL       tband ext and add JL Nd Blue 10x10" ND ND JL       Towel functional IR stretch JL ND 10x10" ND         tband ER    Red 10x10"  ND Green 10x10" JL       Closed chain serratus punch at EOT     10x10" 10x10" nv                   Ther Activity                                       Gait Training Modalities

## 2021-04-02 ENCOUNTER — OFFICE VISIT (OUTPATIENT)
Dept: PHYSICAL THERAPY | Facility: CLINIC | Age: 66
End: 2021-04-02
Payer: COMMERCIAL

## 2021-04-02 DIAGNOSIS — M25.511 RIGHT SHOULDER PAIN, UNSPECIFIED CHRONICITY: Primary | ICD-10-CM

## 2021-04-02 PROCEDURE — 97112 NEUROMUSCULAR REEDUCATION: CPT | Performed by: PHYSICAL THERAPIST

## 2021-04-02 PROCEDURE — 97110 THERAPEUTIC EXERCISES: CPT | Performed by: PHYSICAL THERAPIST

## 2021-04-02 PROCEDURE — 97140 MANUAL THERAPY 1/> REGIONS: CPT | Performed by: PHYSICAL THERAPIST

## 2021-04-02 NOTE — PROGRESS NOTES
Daily Note     Today's date: 2021  Patient name: Mundo Briggs  : 1955  MRN: [id number]  Referring provider: Elida Webb*  Dx:   Encounter Diagnosis     ICD-10-CM    1  Right shoulder pain, unspecified chronicity  M25 511                   Subjective: Kendal reports that her anterior shoulder pain has once again returned  She has been mindful to correct posture at rest and with activity  She notices that her pain tends to return overnight, possibly due to sleeping posture  Objective: See treatment diary below      Assessment: Tolerated treatment well and extensive education provided today on shoulder mechanics and self correction of posture using visual cues in mirror    Patient demonstrated fatigue post treatment and would benefit from continued PT      Plan: Continue per plan of care  Progress treatment as tolerated         Precautions: none      Manuals 3/17 3/19 3/22 3/24 3/26 3/29 3/31 4/2     Shoulder PROM  JL Nd ND ND ND JL JL JL     STM to axilla JL Nd Nd Nd Nd JL JL JL     Inferior and posterior  GH joint mobs JL Nd ND Nd Nd JL JL JL     Scapular mobs JL ND ND Nd Nd JL JL JL     Neuro Re-Ed             Upper thoracic extesion stretch over pillow fold with LTR JL ND 5' f/b 10x10"  Nd JL       pulleys JL ND 20x10" ND Nd JL JL JL     Supine posterior capsule stretch   5x30" ND Nd   JL     Table slide flexion  Nd 10x10" ND Nd JL       Table slide ER  ND 5x20" ND ND JL       Wall ER stretch             Wall clocks       RTB 5x      Prone ITY       0# 15x      Education        15'     Ther Ex             TERT supine             scaption with scap setting  ND 10x10" ND 2lbs 20x JL  JL     S/l ER DB  ND ND  3lbs 20x5" ND ND JL  JL     tband ext and add JL Nd Blue 10x10" ND ND JL  JL     Towel functional IR stretch JL ND 10x10" ND         tband ER    Red 10x10"  ND Green 10x10" JL  JL     Closed chain serratus punch at EOT     10x10" 10x10" nv                   Ther Activity Gait Training                                       Modalities

## 2021-04-05 ENCOUNTER — OFFICE VISIT (OUTPATIENT)
Dept: PHYSICAL THERAPY | Facility: CLINIC | Age: 66
End: 2021-04-05
Payer: COMMERCIAL

## 2021-04-05 DIAGNOSIS — M25.511 RIGHT SHOULDER PAIN, UNSPECIFIED CHRONICITY: Primary | ICD-10-CM

## 2021-04-05 PROCEDURE — 97140 MANUAL THERAPY 1/> REGIONS: CPT | Performed by: PHYSICAL THERAPIST

## 2021-04-05 PROCEDURE — 97112 NEUROMUSCULAR REEDUCATION: CPT | Performed by: PHYSICAL THERAPIST

## 2021-04-05 NOTE — PROGRESS NOTES
Daily Note     Today's date: 2021  Patient name: Cj Mckenzie  : 1955  MRN: [id number]  Referring provider: Carmen Barrera  Dx:   Encounter Diagnosis     ICD-10-CM    1  Right shoulder pain, unspecified chronicity  M25 511                   Subjective: Jerry Davalos had a chiropractor appointment on Saturday, but continues to have return of "grabbing" pain in her shoulder  She continues to be mindful of correcting posture throughout the day  Objective: See treatment diary below      Assessment: Tolerated treatment well and continues to educate on shoulder anatomy and how position impacts impingement symptoms   Emily Farmer Patient exhibited good technique with therapeutic exercises      Plan: Continue per plan of care  MD follow up lateral this week  Anticipate decrease PT treatment to 1x/wk with plan to transition to self management        Precautions: none      Manuals 3/17 3/19 3/22 3/24 3/26 3/29 3/31 4/2 4/5    Shoulder PROM  JL Nd ND ND ND JL JL JL JL    STM to axilla JL Nd Nd Nd Nd JL JL JL JL    Inferior and posterior  GH joint mobs JL Nd ND Nd Nd JL JL JL JL    Scapular mobs JL ND ND Nd Nd JL JL JL JL    Neuro Re-Ed             Upper thoracic extesion stretch over pillow fold with LTR JL ND 5' f/b 10x10"  Nd JL       pulleys JL ND 20x10" ND Nd JL JL JL     Supine posterior capsule stretch   5x30" ND Nd   JL     Table slide flexion  Nd 10x10" ND Nd JL       Table slide ER  ND 5x20" ND ND JL       Wall ER stretch             Wall clocks       RTB 5x      Prone ITY       0# 15x  15x    Education        15' 10'    Ther Ex             TERT supine             scaption with scap setting  ND 10x10" ND 2lbs 20x JL  JL     S/l ER DB  ND ND  3lbs 20x5" ND ND JL  JL     tband ext and add JL Nd Blue 10x10" ND ND JL  JL     Towel functional IR stretch JL ND 10x10" ND         tband ER    Red 10x10"  ND Green 10x10" JL  JL     Closed chain serratus punch at EOT     10x10" 10x10" nv                   Ther Activity                                       Gait Training                                       Modalities

## 2021-04-07 ENCOUNTER — OFFICE VISIT (OUTPATIENT)
Dept: PHYSICAL THERAPY | Facility: CLINIC | Age: 66
End: 2021-04-07
Payer: COMMERCIAL

## 2021-04-07 ENCOUNTER — TRANSCRIBE ORDERS (OUTPATIENT)
Dept: PHYSICAL THERAPY | Facility: CLINIC | Age: 66
End: 2021-04-07

## 2021-04-07 DIAGNOSIS — M25.511 RIGHT SHOULDER PAIN, UNSPECIFIED CHRONICITY: Primary | ICD-10-CM

## 2021-04-07 PROCEDURE — 97140 MANUAL THERAPY 1/> REGIONS: CPT | Performed by: PHYSICAL THERAPIST

## 2021-04-07 NOTE — PROGRESS NOTES
Daily Note     Today's date: 2021  Patient name: Fer Flynn  : 1955  MRN: [id number]  Referring provider: Sivakumar Burk*  Dx:   Encounter Diagnosis     ICD-10-CM    1  Right shoulder pain, unspecified chronicity  M25 511                   Subjective: Nimo Floyd had MD follow up today with additional cortisone injection provided  She was recommended to continue with PT for 4 weeks and return to MD to assess progress  Objective: See treatment diary below      Assessment: Tolerated treatment well and focused on MT today due to recent cortisone injection, she will resume exercises over the weekend    Patient demonstrated fatigue post treatment and would benefit from continued PT      Plan: Continue per plan of care  Progress treatment as tolerated         Precautions: none      Manuals 3/17 3/19 3/22 3/24 3/26 3/29 3/31 4/2 4/5 4/7   Shoulder PROM  JL Nd ND ND ND JL JL JL JL JL   STM to axilla JL Nd Nd Nd Nd JL JL JL JL JL   Inferior and posterior  GH joint mobs JL Nd ND Nd Nd JL JL JL JL JL   Scapular mobs JL ND ND Nd Nd JL JL JL JL JL   Neuro Re-Ed             Upper thoracic extesion stretch over pillow fold with LTR JL ND 5' f/b 10x10"  Nd JL       pulleys JL ND 20x10" ND Nd JL JL JL     Supine posterior capsule stretch   5x30" ND Nd   JL     Table slide flexion  Nd 10x10" ND Nd JL       Table slide ER  ND 5x20" ND ND JL       Wall ER stretch             Wall clocks       RTB 5x      Prone ITY       0# 15x  15x    Education        15' 10'    Ther Ex             TERT supine             scaption with scap setting  ND 10x10" ND 2lbs 20x JL  JL     S/l ER DB  ND ND  3lbs 20x5" ND ND JL  JL     tband ext and add JL Nd Blue 10x10" ND ND JL  JL     Towel functional IR stretch JL ND 10x10" ND         tband ER    Red 10x10"  ND Green 10x10" JL  JL     Closed chain serratus punch at EOT     10x10" 10x10" nv                   Ther Activity                                       Gait Training Modalities

## 2021-04-13 ENCOUNTER — OFFICE VISIT (OUTPATIENT)
Dept: PHYSICAL THERAPY | Facility: CLINIC | Age: 66
End: 2021-04-13
Payer: COMMERCIAL

## 2021-04-13 DIAGNOSIS — M25.511 RIGHT SHOULDER PAIN, UNSPECIFIED CHRONICITY: Primary | ICD-10-CM

## 2021-04-13 PROCEDURE — 97140 MANUAL THERAPY 1/> REGIONS: CPT

## 2021-04-13 NOTE — PROGRESS NOTES
Daily Note     Today's date: 2021  Patient name: Armani Sanchez  : 1955  MRN: [id number]  Referring provider: Norma Fletcher  Dx:   Encounter Diagnosis     ICD-10-CM    1  Right shoulder pain, unspecified chronicity  M25 511                   Subjective: Kendal reports going for massage with decrease pain and improved mobility right shoulder  Pt compliant with HEP  Objective: See treatment diary below      Assessment: Tolerated treatment well  Right shoulder A/PROM cont's to improve slowly  Pt has good understanding of HEP  Patient would benefit from continued PT  Plan: Continue per plan of care        Precautions: none      Manuals 4/13 3/19 3/22 3/24 3/26 3/29 3/31 4/2 4/5 4/7   Shoulder PROM  GH Nd ND ND ND JL JL JL JL JL   STM to axilla [address] Nd Nd Nd Nd JL JL JL JL JL   Inferior and posterior  [address] joint mobs  Nd ND Nd Nd JL JL JL JL JL   Scapular mobs  GH  ND ND Nd Nd JL JL JL JL JL   Neuro Re-Ed             Upper thoracic extesion stretch over pillow fold with LTR GH ND 5' f/b 10x10"  Nd JL       pulleys  ND 20x10" ND Nd JL JL JL     Supine posterior capsule stretch   5x30" ND Nd   JL     Table slide flexion  Nd 10x10" ND Nd JL       Table slide ER  ND 5x20" ND ND JL       Wall ER stretch             Wall clocks       RTB 5x      Prone ITY       0# 15x  15x    Education        15' 10'    Ther Ex             TERT supine             scaption with scap setting  ND 10x10" ND 2lbs 20x JL  JL     S/l ER DB  ND ND  3lbs 20x5" ND ND JL  JL     tband ext and add  Nd Blue 10x10" ND ND JL  JL     Towel functional IR stretch  ND 10x10" ND         tband ER    Red 10x10"  ND Green 10x10" JL  JL     Closed chain serratus punch at EOT     10x10" 10x10" nv                   Ther Activity                                       Gait Training                                       Modalities

## 2021-04-14 ENCOUNTER — APPOINTMENT (OUTPATIENT)
Dept: PHYSICAL THERAPY | Facility: CLINIC | Age: 66
End: 2021-04-14
Payer: COMMERCIAL

## 2021-04-16 ENCOUNTER — OFFICE VISIT (OUTPATIENT)
Dept: PHYSICAL THERAPY | Facility: CLINIC | Age: 66
End: 2021-04-16
Payer: COMMERCIAL

## 2021-04-16 DIAGNOSIS — M25.511 RIGHT SHOULDER PAIN, UNSPECIFIED CHRONICITY: Primary | ICD-10-CM

## 2021-04-16 PROCEDURE — 97112 NEUROMUSCULAR REEDUCATION: CPT | Performed by: PHYSICAL THERAPIST

## 2021-04-16 NOTE — PROGRESS NOTES
Daily Note     Today's date: 2021  Patient name: Alfredo Alpers  : 1955  MRN: [id number]  Referring provider: Wale Schrader  Dx:   Encounter Diagnosis     ICD-10-CM    1  Right shoulder pain, unspecified chronicity  M25 511                   Subjective: Kendal reports continued shoulder soreness  She attends chiropractor and massage therapist 1x/wk outside of PT  She has been mowing grass and staying busy with yard work against the advice of MD       Objective: See treatment diary below      Assessment: Tolerated treatment well and continued to focus on MT during therapy with exercises performed at home    Patient would benefit from continued PT      Plan: Continue per plan of care        Precautions: none      Manuals 4/13 4/16 3/22 3/24 3/26 3/29 3/31 4/2 4/5 4/7   Shoulder PROM  GH JL ND ND ND JL JL JL JL JL   STM to axilla GH JL Nd Nd Nd JL JL JL JL JL   Inferior and posterior  GH joint mobs  JL ND Nd Nd JL JL JL JL JL   Scapular mobs  GH  JL ND Nd Nd JL JL JL JL JL   Neuro Re-Ed             Upper thoracic extesion stretch over pillow fold with LTR GH  5' f/b 10x10"  Nd JL       pulleys   20x10" ND Nd JL JL JL     Supine posterior capsule stretch   5x30" ND Nd   JL     Table slide flexion   10x10" ND Nd JL       Table slide ER   5x20" ND ND JL       Wall ER stretch             Wall clocks       RTB 5x      Prone ITY       0# 15x  15x    Education        15' 10'    Ther Ex             TERT supine             scaption with scap setting   10x10" ND 2lbs 20x JL  JL     S/l ER DB   ND  3lbs 20x5" ND ND JL  JL     tband ext and add   Blue 10x10" ND ND JL  JL     Towel functional IR stretch   10x10" ND         tband ER    Red 10x10"  ND Green 10x10" JL  JL     Closed chain serratus punch at EOT     10x10" 10x10" nv                   Ther Activity                                       Gait Training                                       Modalities

## 2021-04-20 ENCOUNTER — OFFICE VISIT (OUTPATIENT)
Dept: PHYSICAL THERAPY | Facility: CLINIC | Age: 66
End: 2021-04-20
Payer: COMMERCIAL

## 2021-04-20 DIAGNOSIS — M25.511 RIGHT SHOULDER PAIN, UNSPECIFIED CHRONICITY: Primary | ICD-10-CM

## 2021-04-20 PROCEDURE — 97112 NEUROMUSCULAR REEDUCATION: CPT | Performed by: PHYSICAL THERAPIST

## 2021-04-20 NOTE — PROGRESS NOTES
Daily Note     Today's date: 2021  Patient name: Dian Campbell  : 1955  MRN: [id number]  Referring provider: Tabitha Pedersen*  Dx:   Encounter Diagnosis     ICD-10-CM    1  Right shoulder pain, unspecified chronicity  M25 511                   Subjective: Pt reports that she has been feeling good overall but notices stiffness in the shoulder and attributes to muscle tightness  Objective: See treatment diary below      Assessment: Tolerated treatment well  Patient would benefit from continued PT  Mild pain with active abduction, resolved post mobilization  Continue to mobilize as tolerated  Plan: Continue per plan of care        Precautions: none      Manuals 4/13 4/16 4/20 3/24 3/26 3/29 3/31 4/2 4/5 4/7   Shoulder PROM  GH JL CB ND ND JL JL JL JL JL   STM to axilla GH JL CB Nd Nd JL JL JL JL JL   Inferior and posterior  GH joint mobs  JL CB Nd Nd JL JL JL JL JL   Scapular mobs  GH  JL CB Nd Nd JL JL JL JL JL   Neuro Re-Ed             Upper thoracic extesion stretch over pillow fold with LTR GH    Nd JL       pulleys    ND Nd JL JL JL     Supine posterior capsule stretch    ND Nd   JL     Table slide flexion    ND Nd JL       Table slide ER    ND ND JL       Wall ER stretch             Wall clocks       RTB 5x      Prone ITY       0# 15x  15x    Education        15' 10'    Ther Ex             TERT supine             scaption with scap setting    ND 2lbs 20x JL  JL     S/l ER DB    ND ND JL  JL     tband ext and add    ND ND JL  JL     Towel functional IR stretch    ND         tband ER     ND Green 10x10" JL  JL     Closed chain serratus punch at EOT     10x10" 10x10" nv                   Ther Activity                                       Gait Training                                       Modalities

## 2021-04-23 ENCOUNTER — APPOINTMENT (OUTPATIENT)
Dept: PHYSICAL THERAPY | Facility: CLINIC | Age: 66
End: 2021-04-23
Payer: COMMERCIAL

## 2021-04-27 ENCOUNTER — OFFICE VISIT (OUTPATIENT)
Dept: PHYSICAL THERAPY | Facility: CLINIC | Age: 66
End: 2021-04-27
Payer: COMMERCIAL

## 2021-04-27 DIAGNOSIS — M25.511 RIGHT SHOULDER PAIN, UNSPECIFIED CHRONICITY: Primary | ICD-10-CM

## 2021-04-27 PROCEDURE — 97112 NEUROMUSCULAR REEDUCATION: CPT | Performed by: PHYSICAL THERAPIST

## 2021-04-27 NOTE — PROGRESS NOTES
Daily Note     Today's date: 2021  Patient name: Armani Sanchez  : 1955  MRN: [id number]  Referring provider: Norma Fletcher  Dx:   Encounter Diagnosis     ICD-10-CM    1  Right shoulder pain, unspecified chronicity  M25 511                   Subjective: Kendal reports shoulder motion is improving but with increased soreness  She has been very active at home with yard work  Objective: See treatment diary below      Assessment: Tolerated treatment well and demonstrates increased pain from ROM by end of session    Patient would benefit from continued PT      Plan: Continue per plan of care        Precautions: none      Manuals 4/13 4/16 4/20 3/24 4/27 3/29 3/31 4/2 4/5 4/7   Shoulder PROM  GH JL CB ND JL JL JL JL JL JL   STM to axilla GH JL CB Nd JL JL JL JL JL JL   Inferior and posterior  GH joint mobs  JL CB Nd JL JL JL JL JL JL   Scapular mobs  GH  JL CB Nd JL JL JL JL JL JL   Neuro Re-Ed             Upper thoracic extesion stretch over pillow fold with LTR GH     JL       pulleys    ND  JL JL JL     Supine posterior capsule stretch    ND    JL     Table slide flexion    ND  JL       Table slide ER    ND  JL       Wall ER stretch             Wall clocks       RTB 5x      Prone ITY       0# 15x  15x    Education        15' 10'    Ther Ex             TERT supine             scaption with scap setting    ND  JL  JL     S/l ER DB    ND  JL  JL     tband ext and add    ND  JL  JL     Towel functional IR stretch    ND         tband ER     ND  JL  JL     Closed chain serratus punch at EOT      10x10" nv                   Ther Activity                                       Gait Training                                       Modalities

## 2021-04-30 ENCOUNTER — OFFICE VISIT (OUTPATIENT)
Dept: PHYSICAL THERAPY | Facility: CLINIC | Age: 66
End: 2021-04-30
Payer: COMMERCIAL

## 2021-04-30 DIAGNOSIS — M25.511 RIGHT SHOULDER PAIN, UNSPECIFIED CHRONICITY: Primary | ICD-10-CM

## 2021-04-30 PROCEDURE — 97112 NEUROMUSCULAR REEDUCATION: CPT | Performed by: PHYSICAL THERAPIST

## 2021-04-30 NOTE — PROGRESS NOTES
Daily Note     Today's date: 2021  Patient name: Nancy Cr  : 1955  MRN: [id number]  Referring provider: Nemesio Pickens*  Dx:   Encounter Diagnosis     ICD-10-CM    1  Right shoulder pain, unspecified chronicity  M25 511                   Subjective: Ekndal reports shoulder soreness, but has been doing 6 hours of work around her house/yard daily  Objective: See treatment diary below      Assessment: Tolerated treatment well and decreased soreness with full AROM by end of session    Patient would benefit from continued PT      Plan: Continue per plan of care        Precautions: none      Manuals          Shoulder PROM  GH JL CB JL         STM to axilla GH JL CB JL         Inferior and posterior  [facility] joint mobs  JL CB JL         Scapular mobs  GH  JL CB JL         Nerve glides    JL         Subscap, serratus ant     JL         Neuro Re-Ed             Upper thoracic extesion stretch over pillow fold with LTR GH            pulleys             Supine posterior capsule stretch             Table slide flexion             Table slide ER             Wall ER stretch             Wall clocks             Prone ITY             Education             Ther Ex             TERT supine             scaption with scap setting             S/l ER DB             tband ext and add             Towel functional IR stretch             tband ER              Closed chain serratus punch at EOT                          Ther Activity                                       Gait Training                                       Modalities

## 2021-05-04 ENCOUNTER — APPOINTMENT (OUTPATIENT)
Dept: PHYSICAL THERAPY | Facility: CLINIC | Age: 66
End: 2021-05-04
Payer: COMMERCIAL

## 2021-05-07 ENCOUNTER — OFFICE VISIT (OUTPATIENT)
Dept: PHYSICAL THERAPY | Facility: CLINIC | Age: 66
End: 2021-05-07
Payer: COMMERCIAL

## 2021-05-07 DIAGNOSIS — M25.511 RIGHT SHOULDER PAIN, UNSPECIFIED CHRONICITY: Primary | ICD-10-CM

## 2021-05-07 PROCEDURE — 97112 NEUROMUSCULAR REEDUCATION: CPT | Performed by: PHYSICAL THERAPIST

## 2021-05-14 ENCOUNTER — OFFICE VISIT (OUTPATIENT)
Dept: PHYSICAL THERAPY | Facility: CLINIC | Age: 66
End: 2021-05-14
Payer: COMMERCIAL

## 2021-05-14 DIAGNOSIS — M25.511 RIGHT SHOULDER PAIN, UNSPECIFIED CHRONICITY: Primary | ICD-10-CM

## 2021-05-14 PROCEDURE — 97112 NEUROMUSCULAR REEDUCATION: CPT | Performed by: PHYSICAL THERAPIST

## 2021-05-14 NOTE — PROGRESS NOTES
Daily Note     Today's date: 2021  Patient name: Cecilia Joe  : 1955  MRN: [id number]  Referring provider: Rodrigo Nelson*  Dx:   Encounter Diagnosis     ICD-10-CM    1  Right shoulder pain, unspecified chronicity  M25 511                   Subjective: Pt reports some continued irritation in lateral shoulder  Objective: See treatment diary below      Assessment: Tolerated treatment well  Patient would benefit from continued PT  Pt with reproduced lateral shoulder pain with 1st rib mobilization  Provided scalene stretch for home  Shoulder mobility improved well  Plan: Continue per plan of care        Precautions: none      Manuals        Shoulder PROM  GH JL CB JL JL CB       STM to axilla GH JL CB JL JL CB       Inferior and posterior  GH joint mobs  JL CB JL JL CB       Scapular mobs  GH  JL CB JL JL CB SL 1st rib mob       Nerve glides    JL JL        Subscap, serratus ant     JL JL CB       Neuro Re-Ed             Upper thoracic extesion stretch over pillow fold with LTR GH            pulleys             Supine posterior capsule stretch             Table slide flexion             Table slide ER             Wall ER stretch             Wall clocks             Prone ITY             Education             Ther Ex             TERT supine             scaption with scap setting             S/l ER DB             tband ext and add             Towel functional IR stretch             tband ER              Closed chain serratus punch at EOT                          Ther Activity                                       Gait Training                                       Modalities

## 2021-05-21 ENCOUNTER — OFFICE VISIT (OUTPATIENT)
Dept: PHYSICAL THERAPY | Facility: CLINIC | Age: 66
End: 2021-05-21
Payer: COMMERCIAL

## 2021-05-21 DIAGNOSIS — M25.511 RIGHT SHOULDER PAIN, UNSPECIFIED CHRONICITY: Primary | ICD-10-CM

## 2021-05-21 PROCEDURE — 97112 NEUROMUSCULAR REEDUCATION: CPT | Performed by: PHYSICAL THERAPIST

## 2021-05-21 NOTE — PROGRESS NOTES
Daily Note     Today's date: 2021  Patient name: Adan Dee  : 1955  MRN: [id number]  Referring provider: Bambi Boss*  Dx:   Encounter Diagnosis     ICD-10-CM    1  Right shoulder pain, unspecified chronicity  M25 511                   Subjective: Kendal reports elimination of pain for 2 days after last visit  Noted benefit from 1 rib mobs      Objective: See treatment diary below      Assessment: Tolerated treatment well and demonstrates nearly full AROM, with limitation noted in BTB IR  MD follow up scheduled for next week        Plan: Continue per plan of care        Precautions: none      Manuals        Shoulder PROM  GH JL CB JL JL CB       STM to axilla GH JL CB JL JL CB       Inferior and posterior  GH joint mobs  JL CB JL JL CB       Scapular mobs  GH  JL CB JL JL CB SL 1st rib mob       Nerve glides    JL JL        Subscap, serratus ant     JL JL CB       Neuro Re-Ed             Upper thoracic extesion stretch over pillow fold with LTR GH            pulleys             Supine posterior capsule stretch             Table slide flexion             Table slide ER             Wall ER stretch             Wall clocks             Prone ITY             Education             Ther Ex             TERT supine             scaption with scap setting             S/l ER DB             tband ext and add             Towel functional IR stretch             tband ER              Closed chain serratus punch at EOT                          Ther Activity                                       Gait Training                                       Modalities

## 2021-05-28 ENCOUNTER — APPOINTMENT (OUTPATIENT)
Dept: PHYSICAL THERAPY | Facility: CLINIC | Age: 66
End: 2021-05-28
Payer: COMMERCIAL

## 2022-09-22 ENCOUNTER — EVALUATION (OUTPATIENT)
Dept: PHYSICAL THERAPY | Facility: CLINIC | Age: 67
End: 2022-09-22
Payer: COMMERCIAL

## 2022-09-22 DIAGNOSIS — Z96.611 S/P REVERSE TOTAL SHOULDER ARTHROPLASTY, RIGHT: Primary | ICD-10-CM

## 2022-09-22 PROCEDURE — 97110 THERAPEUTIC EXERCISES: CPT | Performed by: PHYSICAL THERAPIST

## 2022-09-22 PROCEDURE — 97140 MANUAL THERAPY 1/> REGIONS: CPT | Performed by: PHYSICAL THERAPIST

## 2022-09-22 PROCEDURE — 97161 PT EVAL LOW COMPLEX 20 MIN: CPT | Performed by: PHYSICAL THERAPIST
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
## 2022-09-26 ENCOUNTER — OFFICE VISIT (OUTPATIENT)
Dept: PHYSICAL THERAPY | Facility: CLINIC | Age: 67
End: 2022-09-26
Payer: COMMERCIAL

## 2022-09-26 DIAGNOSIS — Z96.611 S/P REVERSE TOTAL SHOULDER ARTHROPLASTY, RIGHT: Primary | ICD-10-CM

## 2022-09-26 PROCEDURE — 97112 NEUROMUSCULAR REEDUCATION: CPT | Performed by: PHYSICAL THERAPIST

## 2022-09-26 PROCEDURE — 97110 THERAPEUTIC EXERCISES: CPT | Performed by: PHYSICAL THERAPIST

## 2022-09-26 PROCEDURE — 97140 MANUAL THERAPY 1/> REGIONS: CPT | Performed by: PHYSICAL THERAPIST

## 2022-09-26 NOTE — PROGRESS NOTES
Daily Note     Today's date: 2022  Patient name: Arsenio Buck  : 1955  MRN: [id number]  Referring provider: Myriam Wade MD  Dx:   Encounter Diagnosis     ICD-10-CM    1  S/P reverse total shoulder arthroplasty, right  Z96 611                   Subjective: Pt reports no complaints with current HEP  She does have some neck pain recently  Objective: See treatment diary below      Assessment: Tolerated treatment well  Patient would benefit from continued PT  Pt with significant R sided SCM restriction and educated on stretching  Plan: Continue per plan of care  Precautions: DOS: 22; 4 weeks 10/18/22  DC sling at 4 weeks, max ER of 30*, no active IR, AAROM shoulder flexion to 110*  Trim suture ends to skin level POD #10    Manuals            PROM within protocol CB CB           SCM STM  CB                                     Neuro Re-Ed             Brenna Fong grib w/scap retract 10x10: home                                                                                         Ther Ex             Supine pec stretch w/LTR nv 10x10:           UT stretch nv 10x10:           pendulums 2' CB           Assisted elbow flex/ext reviewed home           Wrist ext/flex stretch reviewed home           Table slides flexion  8x10:                                      Ther Activity                                       Gait Training                                       Modalities

## 2022-09-28 ENCOUNTER — OFFICE VISIT (OUTPATIENT)
Dept: PHYSICAL THERAPY | Facility: CLINIC | Age: 67
End: 2022-09-28
Payer: COMMERCIAL

## 2022-09-28 DIAGNOSIS — Z96.611 S/P REVERSE TOTAL SHOULDER ARTHROPLASTY, RIGHT: Primary | ICD-10-CM

## 2022-09-28 PROCEDURE — 97110 THERAPEUTIC EXERCISES: CPT | Performed by: PHYSICAL THERAPIST

## 2022-09-28 PROCEDURE — 97140 MANUAL THERAPY 1/> REGIONS: CPT | Performed by: PHYSICAL THERAPIST

## 2022-09-28 NOTE — PROGRESS NOTES
Daily Note     Today's date: 2022  Patient name: Felipe Amaya  : 1955  MRN: [id number]  Referring provider: Sharon Briscoe MD  Dx:   Encounter Diagnosis     ICD-10-CM    1  S/P reverse total shoulder arthroplasty, right  Z96 611                   Subjective: Pt reports similar pains for the last few days  Objective: See treatment diary below      Assessment: Tolerated treatment well  Patient would benefit from continued PT  Continue to mobilize as tolerated  Plan: Continue per plan of care        Precautions: DOS: 22; 4 weeks 10/18/22  DC sling at 4 weeks, max ER of 30*, no active IR, AAROM shoulder flexion to 110*  Trim suture ends to skin level POD #10    Manuals           PROM within protocol CB CB CB          SCM STM  CB CB                                    Neuro Re-Ed             Ball grib w/scap retract 10x10: home                                                                                         Ther Ex             Supine pec stretch w/LTR nv 10x10: CB          UT stretch nv 10x10: CB          pendulums 2' CB CB          Assisted elbow flex/ext reviewed home           Wrist ext/flex stretch reviewed home           Table slides flexion  8x10: NV                                    Ther Activity                                       Gait Training                                       Modalities

## 2022-10-03 ENCOUNTER — OFFICE VISIT (OUTPATIENT)
Dept: PHYSICAL THERAPY | Facility: CLINIC | Age: 67
End: 2022-10-03
Payer: COMMERCIAL

## 2022-10-03 DIAGNOSIS — Z96.611 S/P REVERSE TOTAL SHOULDER ARTHROPLASTY, RIGHT: Primary | ICD-10-CM

## 2022-10-03 PROCEDURE — 97140 MANUAL THERAPY 1/> REGIONS: CPT | Performed by: PHYSICAL THERAPIST

## 2022-10-03 PROCEDURE — 97110 THERAPEUTIC EXERCISES: CPT | Performed by: PHYSICAL THERAPIST

## 2022-10-03 NOTE — PROGRESS NOTES
Daily Note     Today's date: 10/3/2022  Patient name: Aleja Cool  : 1955  MRN: [id number]  Referring provider: Ngoc Robbins MD  Dx:   Encounter Diagnosis     ICD-10-CM    1  S/P reverse total shoulder arthroplasty, right  Z96 611                   Subjective: Pt reports continued pain in the shoulder and exercises going well  Objective: See treatment diary below      Assessment: Tolerated treatment well  Patient would benefit from continued PT  Pt with decreased pain in PROM flexion and ER post cervical STM  Continue to mobilize as tolerated  All motions improving in range and level of pain  Plan: Continue per plan of care  Precautions: DOS: 22; 4 weeks 10/18/22  DC sling at 4 weeks, max ER of 30*, no active IR, AAROM shoulder flexion to 110*  Trim suture ends to skin level POD #10    Manuals 9/22 9/26 9/28 10/3         PROM within protocol CB CB CB CB         SCM STM  CB CB CB                                   Neuro Re-Ed             Ball grib w/scap retract 10x10: home                                                                                         Ther Ex             Supine pec stretch w/LTR nv 10x10: CB CB         UT stretch nv 10x10: CB CB         pendulums 2' CB CB CB         Assisted elbow flex/ext reviewed home           Wrist ext/flex stretch reviewed home           Table slides flexion  8x10: NV 10x10:         Supine wand ER    10x10:                       Ther Activity                                       Gait Training                                       Modalities

## 2022-10-05 ENCOUNTER — OFFICE VISIT (OUTPATIENT)
Dept: PHYSICAL THERAPY | Facility: CLINIC | Age: 67
End: 2022-10-05
Payer: COMMERCIAL

## 2022-10-05 DIAGNOSIS — Z96.611 S/P REVERSE TOTAL SHOULDER ARTHROPLASTY, RIGHT: Primary | ICD-10-CM

## 2022-10-05 PROCEDURE — 97140 MANUAL THERAPY 1/> REGIONS: CPT | Performed by: PHYSICAL THERAPIST

## 2022-10-05 PROCEDURE — 97110 THERAPEUTIC EXERCISES: CPT | Performed by: PHYSICAL THERAPIST

## 2022-10-05 NOTE — PROGRESS NOTES
Daily Note     Today's date: 10/5/2022  Patient name: Tammie Harris  : 1955  MRN: [id number]  Referring provider: Hebert Eastman MD  Dx:   Encounter Diagnosis     ICD-10-CM    1  S/P reverse total shoulder arthroplasty, right  Z96 611                   Subjective: Pt reports continued improvement in ROM and continues with some lateral arm pain  Objective: See treatment diary below      Assessment: Tolerated treatment well  Patient would benefit from continued PT  Improved ER PROM post subscap release  ROm improvements all planes post cervical STM  Plan: Continue per plan of care  Precautions: DOS: 22; 4 weeks 10/18/22  DC sling at 4 weeks, max ER of 30*, no active IR, AAROM shoulder flexion to 110*  Trim suture ends to skin level POD #10    Manuals 9/22 9/26 9/28 10/3 10/5        PROM within protocol CB CB CB CB CB        SCM STM  CB CB CB CB        subscap release     CB                     Neuro Re-Ed             Ball grib w/scap retract 10x10: home           scap sets     10x10:                                                                          Ther Ex             Supine pec stretch w/LTR nv 10x10: CB CB CB        UT stretch nv 10x10: CB CB CB        pendulums 2' CB CB CB CB        Assisted elbow flex/ext reviewed home           Wrist ext/flex stretch reviewed home           Table slides flexion  8x10: NV 10x10: 15x10:        Supine wand ER    10x10: np                     Ther Activity                                       Gait Training                                       Modalities

## 2022-10-07 ENCOUNTER — OFFICE VISIT (OUTPATIENT)
Dept: PHYSICAL THERAPY | Facility: CLINIC | Age: 67
End: 2022-10-07
Payer: COMMERCIAL

## 2022-10-07 DIAGNOSIS — Z96.611 S/P REVERSE TOTAL SHOULDER ARTHROPLASTY, RIGHT: Primary | ICD-10-CM

## 2022-10-07 PROCEDURE — 97140 MANUAL THERAPY 1/> REGIONS: CPT | Performed by: PHYSICAL THERAPIST

## 2022-10-07 PROCEDURE — 97110 THERAPEUTIC EXERCISES: CPT | Performed by: PHYSICAL THERAPIST

## 2022-10-07 NOTE — PROGRESS NOTES
Daily Note     Today's date: 10/7/2022  Patient name: Felipe Amaya  : 1955  MRN: [id number]  Referring provider: Sharon Briscoe MD  Dx:   Encounter Diagnosis     ICD-10-CM    1  S/P reverse total shoulder arthroplasty, right  Z96 611                   Subjective: Pt reports some increased arm pain today and stiffness  Objective: See treatment diary below      Assessment: Tolerated treatment well  Patient would benefit from continued PT  Increased cervical tightness today relative to prior visits and focused on mobilization with education to continue stretches later tonight  Plan: Continue per plan of care        Precautions: DOS: 22; 4 weeks 10/18/22  DC sling at 4 weeks, max ER of 30*, no active IR, AAROM shoulder flexion to 110*  Trim suture ends to skin level POD #10    Manuals 9/22 9/26 9/28 10/3 10/5 10/7       PROM within protocol CB CB CB CB CB CB       SCM STM  CB CB CB CB CB       subscap release     CB CB                    Neuro Re-Ed             Ball grib w/scap retract 10x10: home           scap sets     10x10: NV                                                                        Ther Ex             Supine pec stretch w/LTR nv 10x10: CB CB CB CB       UT stretch nv 10x10: CB CB CB CB       pendulums 2' CB CB CB CB CB       Assisted elbow flex/ext reviewed home           Wrist ext/flex stretch reviewed home           Table slides flexion  8x10: NV 10x10: 15x10: NV       Supine wand ER    10x10: np NV                    Ther Activity                                       Gait Training                                       Modalities

## 2022-10-10 ENCOUNTER — EVALUATION (OUTPATIENT)
Dept: PHYSICAL THERAPY | Facility: CLINIC | Age: 67
End: 2022-10-10
Payer: COMMERCIAL

## 2022-10-10 DIAGNOSIS — Z96.611 S/P REVERSE TOTAL SHOULDER ARTHROPLASTY, RIGHT: Primary | ICD-10-CM

## 2022-10-10 PROCEDURE — 97110 THERAPEUTIC EXERCISES: CPT | Performed by: PHYSICAL THERAPIST

## 2022-10-10 PROCEDURE — 97140 MANUAL THERAPY 1/> REGIONS: CPT | Performed by: PHYSICAL THERAPIST

## 2022-10-10 NOTE — PROGRESS NOTES
Re-evaluation     Today's date: 10/10/2022  Patient name: Dian Campbell  : 1955  MRN: [id number]  Referring provider: Mariella Melendez MD  Dx:   Encounter Diagnosis     ICD-10-CM    1  S/P reverse total shoulder arthroplasty, right  Z96 611                   Subjective: Pt reports improvement in pain for a few days and possibly overdid it a little yesterday with more pain today  Objective: See treatment diary below      Assessment: Tolerated treatment well  Patient would benefit from continued PT  Incisions clean and dry with no signs of infection  Continue to mobilize as tolerated  Plan: Continue per plan of care  Precautions: DOS: 22; 4 weeks 10/18/22  DC sling at 4 weeks, max ER of 30*, no active IR, AAROM shoulder flexion to 110*  Trim suture ends to skin level POD #10    Manuals 9/22 9/26 9/28 10/3 10/5 10/7 10/10      PROM within protocol CB CB CB CB CB CB CB      SCM STM  CB CB CB CB CB CB      subscap release     CB CB CB                   Neuro Re-Ed             Ball grib w/scap retract 10x10: home           scap sets     10x10: NV                                                                        Ther Ex             Supine pec stretch w/LTR nv 10x10: CB CB CB CB CB      UT stretch nv 10x10: CB CB CB CB CB      pendulums 2' CB CB CB CB CB CB      Assisted elbow flex/ext reviewed home           Wrist ext/flex stretch reviewed home           Table slides flexion  8x10: NV 10x10: 15x10: NV nv      Supine wand ER    10x10: np NV 10x10:                    Ther Activity                                       Gait Training                                       Modalities

## 2022-10-12 ENCOUNTER — OFFICE VISIT (OUTPATIENT)
Dept: PHYSICAL THERAPY | Facility: CLINIC | Age: 67
End: 2022-10-12
Payer: COMMERCIAL

## 2022-10-12 DIAGNOSIS — Z96.611 S/P REVERSE TOTAL SHOULDER ARTHROPLASTY, RIGHT: Primary | ICD-10-CM

## 2022-10-12 PROCEDURE — 97110 THERAPEUTIC EXERCISES: CPT | Performed by: PHYSICAL THERAPIST

## 2022-10-12 PROCEDURE — 97140 MANUAL THERAPY 1/> REGIONS: CPT | Performed by: PHYSICAL THERAPIST

## 2022-10-12 NOTE — PROGRESS NOTES
Daily Note     Today's date: 10/12/2022  Patient name: Aliza Marc  : 1955  MRN: [id number]  Referring provider: Genaro Dela Cruz MD  Dx:   Encounter Diagnosis     ICD-10-CM    1  S/P reverse total shoulder arthroplasty, right  Z96 611                   Subjective: Pt notes decreased pain at night recently  She continues to feel some discomfort in anterior arm  Objective: See treatment diary below      Assessment: Tolerated treatment well  Patient would benefit from continued PT  Continue to mobilize as tolerated  Shoulder flexion PROM 110*, 25* ER  Significant cervical stiffness and TTP throughout scalenes, SCM and paraspinals today  Plan: Continue per plan of care   f/u with physician 10/13/22     Precautions: DOS: 22; 4 weeks 10/18/22  DC sling at 4 weeks, max ER of 30*, no active IR, AAROM shoulder flexion to 110*  Trim suture ends to skin level POD #10    Manuals 9/22 9/26 9/28 10/3 10/5 10/7 10/10 10/12     PROM within protocol CB CB CB CB CB CB CB CB     SCM STM  CB CB CB CB CB CB CB     subscap release     CB CB CB CB                  Neuro Re-Ed             Ball grib w/scap retract 10x10: home           scap sets     10x10: NV                                                                        Ther Ex             Supine pec stretch w/LTR nv 10x10: CB CB CB CB CB CB     UT stretch nv 10x10: CB CB CB CB CB CB     pendulums 2' CB CB CB CB CB CB CB     Assisted elbow flex/ext reviewed home           Wrist ext/flex stretch reviewed home           Table slides flexion  8x10: NV 10x10: 15x10: NV nv 10x10:     Supine wand ER    10x10: np NV 10x10: nv                  Ther Activity                                       Gait Training                                       Modalities

## 2022-10-18 ENCOUNTER — OFFICE VISIT (OUTPATIENT)
Dept: PHYSICAL THERAPY | Facility: CLINIC | Age: 67
End: 2022-10-18
Payer: COMMERCIAL

## 2022-10-18 DIAGNOSIS — Z96.611 S/P REVERSE TOTAL SHOULDER ARTHROPLASTY, RIGHT: Primary | ICD-10-CM

## 2022-10-18 PROCEDURE — 97164 PT RE-EVAL EST PLAN CARE: CPT | Performed by: PHYSICAL THERAPIST

## 2022-10-18 PROCEDURE — 97140 MANUAL THERAPY 1/> REGIONS: CPT | Performed by: PHYSICAL THERAPIST

## 2022-10-18 PROCEDURE — 97110 THERAPEUTIC EXERCISES: CPT | Performed by: PHYSICAL THERAPIST

## 2022-10-18 NOTE — PROGRESS NOTES
Re-evaluation     Today's date: 10/18/2022  Patient name: Min London  : 1955  MRN: [id number]  Referring provider: Yvan Maria MD  Dx:   Encounter Diagnosis     ICD-10-CM    1  S/P reverse total shoulder arthroplasty, right  Z96 611                   Subjective: Pt notes worst pain in the last week of 10/10 secondary to DC of sling recently and trying to sleep at night without it  Average pain recently has been 7/10 as well  Pain tends to travel into lateral arm  Pt reports in the last 2 weeks similar amounts of irritation in the shoulder  She does feel improvements in pain after soft tissue mobilization to surrounding musculature  Pt continues to be unable to actively lift the arm  She notes doing some work outside recently on [employer] with minimal use of the shoulder  Educated that this could potentially cause greater pain due to prolonged shoulder guarding and to limit as much as possible  She would like to improve on her strength and active use of the arm as allowed  Objective: See treatment diary below  PROM:   R shoulder flexion: 110*  R shoulder ER: 25*  R elbow PROM: WFL    Scapular mobility: limited depression secondary to restricted LV, UT  Scapular control: appropriate scap set within available ROM  R scalene restriction with radiating pain lateral arm  +ULTTmedian with lateral arm pain    Assessment: Tolerated treatment well  Patient would benefit from continued PT  Educated on returning to sling for nights to improve comfort level  Pt with continued significant GH PROM deficits and has not yet progressed to active exercise per protocol  Continue to mobilize as tolerated and progress per protocol to allow for functional return of the shoulder  Patient will be able to manage symptoms independently  LT weeks  1  pt will reach foto predicted--> in progress  2  pt will decrease worst pain 75% --> in progress  ST weeks  1   Pt will decrease worst pain 50%--> improving  2  Patient will successfully manage HEP      Plan: Continue per plan of care        Precautions: DOS: 9/20/22; 4 weeks 10/18/22  DC sling at 4 weeks, max ER of 30*, no active IR, AAROM shoulder flexion to 110*  Trim suture ends to skin level POD #10    Manuals 9/22 9/26 9/28 10/3 10/5 10/7 10/10 10/12 10/18    PROM within protocol CB CB CB CB CB CB CB CB CB    SCM STM  CB CB CB CB CB CB CB CB    subscap release     CB CB CB CB CB                 Neuro Re-Ed             Ball grib w/scap retract 10x10: home           scap sets     10x10: NV                                                                        Ther Ex             Supine pec stretch w/LTR nv 10x10: CB CB CB CB CB CB CB    UT stretch nv 10x10: CB CB CB CB CB CB CB    pendulums 2' CB CB CB CB CB CB CB CB    Assisted elbow flex/ext reviewed home           Wrist ext/flex stretch reviewed home           Table slides flexion  8x10: NV 10x10: 15x10: NV nv 10x10: home today    Supine wand ER    10x10: np NV 10x10: nv nv                 Ther Activity                                       Gait Training                                       Modalities

## 2022-10-20 ENCOUNTER — OFFICE VISIT (OUTPATIENT)
Dept: PHYSICAL THERAPY | Facility: CLINIC | Age: 67
End: 2022-10-20
Payer: COMMERCIAL

## 2022-10-20 DIAGNOSIS — Z96.611 S/P REVERSE TOTAL SHOULDER ARTHROPLASTY, RIGHT: Primary | ICD-10-CM

## 2022-10-20 PROCEDURE — 97140 MANUAL THERAPY 1/> REGIONS: CPT | Performed by: PHYSICAL THERAPIST

## 2022-10-20 PROCEDURE — 97110 THERAPEUTIC EXERCISES: CPT | Performed by: PHYSICAL THERAPIST

## 2022-10-20 NOTE — PROGRESS NOTES
Daily Note     Today's date: 10/20/2022  Patient name: Elbert Tillman  : 1955  MRN: [id number]  Referring provider: Jovan Sherwood MD  Dx:   Encounter Diagnosis     ICD-10-CM    1  S/P reverse total shoulder arthroplasty, right  Z96 611                   Subjective: Pt notes continued radiating pain to biceps region that limits her ability to obtain greater ROM in stretching  Objective: See treatment diary below      Assessment: Tolerated treatment well  Patient would benefit from continued PT  Greatest restrictions noted with cervical musculature on R, proximal scar and subscap  Continues with high levels of pain in ER PROM post visit with associated guarding  Plan: Continue per plan of care        Precautions: DOS: 22; 4 weeks 10/18/22  DC sling at 4 weeks, max ER of 30*, no active IR, AAROM shoulder flexion to 110*  Trim suture ends to skin level POD #10    Manuals 9/22 9/26 9/28 10/3 10/5 10/7 10/10 10/12 10/18 10/20   PROM within protocol CB CB CB CB CB CB CB CB CB CB   SCM STM  CB CB CB CB CB CB CB CB CB   subscap release     CB CB CB CB CB CB                Neuro Re-Ed             Ball grib w/scap retract 10x10: home           scap sets     10x10: NV                                                                        Ther Ex             Supine pec stretch w/LTR nv 10x10: CB CB CB CB CB CB CB CB   UT stretch nv 10x10: CB CB CB CB CB CB CB CB   pendulums 2' CB CB CB CB CB CB CB CB CB   Assisted elbow flex/ext reviewed home           Wrist ext/flex stretch reviewed home           Table slides flexion  8x10: NV 10x10: 15x10: NV nv 10x10: home today 15x10:   Supine wand ER    10x10: np NV 10x10: nv nv NV                Ther Activity                                       Gait Training                                       Modalities

## 2022-10-25 ENCOUNTER — OFFICE VISIT (OUTPATIENT)
Dept: PHYSICAL THERAPY | Facility: CLINIC | Age: 67
End: 2022-10-25
Payer: COMMERCIAL

## 2022-10-25 DIAGNOSIS — Z96.611 S/P REVERSE TOTAL SHOULDER ARTHROPLASTY, RIGHT: Primary | ICD-10-CM

## 2022-10-25 PROCEDURE — 97140 MANUAL THERAPY 1/> REGIONS: CPT | Performed by: PHYSICAL THERAPIST

## 2022-10-25 PROCEDURE — 97110 THERAPEUTIC EXERCISES: CPT | Performed by: PHYSICAL THERAPIST

## 2022-10-25 PROCEDURE — 97112 NEUROMUSCULAR REEDUCATION: CPT | Performed by: PHYSICAL THERAPIST

## 2022-10-25 NOTE — PROGRESS NOTES
Daily Note     Today's date: 10/25/2022  Patient name: Tae Albarado  : 1955  MRN: [id number]  Referring provider: Dano Meredith MD  Dx:   Encounter Diagnosis     ICD-10-CM    1  S/P reverse total shoulder arthroplasty, right  Z96 611                   Subjective: Pt reports continued significant pain in the R shoulder, anterior arm and elbow (constant 7-8/10)  She feels this is significantly limiting her ability to progress in exercises  She is planning on messaging the physician today to determine if there are any further measures that can be taken for pain releif  Objective: See treatment diary below      Assessment: Tolerated treatment fair  Patient would benefit from continued PT  Consult was provided by additional PT today for second opinion  We discussed her prior history of adhesive capsulitis and potential to be playing into current pain  Pt was educated to discuss her current sxs with physician secondary to lack of progress in ROM and pain recently  Educated on continued LLLD stretching for the shoulder  Plan: Continue per plan of care        Precautions: DOS: 22; 4 weeks 10/18/22  DC sling at 4 weeks, max ER of 30*, no active IR, AAROM shoulder flexion to 110*  Trim suture ends to skin level POD #10    Manuals 10/25 9/26 9/28 10/3 10/5 10/7 10/10 10/12 10/18 10/20   PROM within protocol  CB CB CB CB CB CB CB CB CB   SCM STM  CB CB CB CB CB CB CB CB CB   subscap release     CB CB CB CB CB CB   Scar massage, GH distraction, forearm ext STM CB w/active stretch            Neuro Re-Ed             [place] grib w/scap retract  home           scap sets     10x10: NV                                                                        Ther Ex             Supine pec stretch w/LTR CB 10x10: CB CB CB CB CB CB CB CB   UT stretch  10x10: CB CB CB CB CB CB CB CB   pendulums  CB CB CB CB CB CB CB CB CB   Assisted elbow flex/ext  home           Wrist ext/flex stretch  home           Table slides flexion  8x10: NV 10x10: 15x10: NV nv 10x10: home today 15x10:   Supine wand ER    10x10: np NV 10x10: nv nv NV                Ther Activity                                       Gait Training                                       Modalities

## 2022-10-28 ENCOUNTER — OFFICE VISIT (OUTPATIENT)
Dept: PHYSICAL THERAPY | Facility: CLINIC | Age: 67
End: 2022-10-28
Payer: COMMERCIAL

## 2022-10-28 DIAGNOSIS — Z96.611 S/P REVERSE TOTAL SHOULDER ARTHROPLASTY, RIGHT: Primary | ICD-10-CM

## 2022-10-28 PROCEDURE — 97110 THERAPEUTIC EXERCISES: CPT | Performed by: PHYSICAL THERAPIST

## 2022-10-28 PROCEDURE — 97112 NEUROMUSCULAR REEDUCATION: CPT | Performed by: PHYSICAL THERAPIST

## 2022-10-28 PROCEDURE — 97140 MANUAL THERAPY 1/> REGIONS: CPT | Performed by: PHYSICAL THERAPIST

## 2022-10-28 NOTE — PROGRESS NOTES
Daily Note     Today's date: 10/28/2022  Patient name: Redd Echols  : 1955  MRN: [id number]  Referring provider: Nuria Giles MD  Dx:   Encounter Diagnosis     ICD-10-CM    1  S/P reverse total shoulder arthroplasty, right  Z96 611                   Subjective: Pt was placed on steriod dose pack and 4 days in currently  Continued pain near biceps region  Secondary to change in hand color, pt is getting doppler  Objective: See treatment diary below      Assessment: Tolerated treatment fair  Patient would benefit from continued PT  Pt with continued pain multiplanes  Continue stretching as tolerated and mobilization for scar tissue  Plan: Continue per plan of care        Precautions: DOS: 22; 4 weeks 10/18/22  DC sling at 4 weeks, max ER of 30*, no active IR, AAROM shoulder flexion to 110*  Trim suture ends to skin level POD #10    Manuals 10/25 10/28 9/28 10/3 10/5 10/7 10/10 10/12 10/18 10/20   PROM within protocol  CB CB CB CB CB CB CB CB CB   SCM STM  CB CB CB CB CB CB CB CB CB   subscap release     CB CB CB CB CB CB   Scar massage, GH distraction, forearm ext STM CB w/active stretch            Neuro Re-Ed             Cruz becerra w/scap retract  home           scap sets     10x10: NV                                                                        Ther Ex             Supine pec stretch w/LTR CB 10x10: CB CB CB CB CB CB CB CB   UT stretch  10x10: CB CB CB CB CB CB CB CB   pendulums  CB CB CB CB CB CB CB CB CB   Assisted elbow flex/ext  home           Wrist ext/flex stretch  home           Table slides flexion  x20 NV 10x10: 15x10: NV nv 10x10: home today 15x10:   Supine wand ER  Seated x20  10x10: np NV 10x10: nv nv NV                Ther Activity                                       Gait Training                                       Modalities

## 2022-11-01 ENCOUNTER — OFFICE VISIT (OUTPATIENT)
Dept: PHYSICAL THERAPY | Facility: CLINIC | Age: 67
End: 2022-11-01

## 2022-11-01 DIAGNOSIS — Z96.611 S/P REVERSE TOTAL SHOULDER ARTHROPLASTY, RIGHT: Primary | ICD-10-CM

## 2022-11-01 NOTE — PROGRESS NOTES
Daily Note     Today's date: 2022  Patient name: Shanna Rivera  : 1955  MRN: [id number]  Referring provider: Tenzin Riley MD  Dx:   Encounter Diagnosis     ICD-10-CM    1  S/P reverse total shoulder arthroplasty, right  Z96 611                   Subjective: Pt notes severe pain in R shoulder girdle 2 days ago, improved after chiropractic visit  Objective: See treatment diary below      Assessment: Tolerated treatment well  Patient would benefit from continued PT  Decreased biceps pain during circumfrential mob biceps into ER  Educated on self mobilization for home  Plan: Continue per plan of care        Precautions: DOS: 22; 4 weeks 10/18/22  DC sling at 4 weeks, max ER of 30*, no active IR, AAROM shoulder flexion to 110*  Trim suture ends to skin level POD #10    Manuals 10/25 10/28 11/1 10/3 10/5 10/7 10/10 10/12 10/18 10/20   PROM within protocol  CB CB CB CB CB CB CB CB CB   SCM STM  CB CB CB CB CB CB CB CB CB   subscap release     CB CB CB CB CB CB   Scar massage, GH distraction, forearm ext STM CB w/active stretch            Neuro Re-Ed             [place] grib w/scap retract  home           scap sets     10x10: NV                                                                        Ther Ex             Supine pec stretch w/LTR CB 10x10: CB CB CB CB CB CB CB CB   UT stretch  10x10: CB CB CB CB CB CB CB CB   pendulums  CB CB CB CB CB CB CB CB CB   Assisted elbow flex/ext  home           Wrist ext/flex stretch  home           Table slides flexion  x20 CB 10x10: 15x10: NV nv 10x10: home today 15x10:   Supine wand ER  Seated x20 CB 10x10: np NV 10x10: nv nv NV   Supine wand flexion   x20          Ther Activity                                       Gait Training                                       Modalities

## 2022-11-03 ENCOUNTER — OFFICE VISIT (OUTPATIENT)
Dept: PHYSICAL THERAPY | Facility: CLINIC | Age: 67
End: 2022-11-03

## 2022-11-03 DIAGNOSIS — Z96.611 S/P REVERSE TOTAL SHOULDER ARTHROPLASTY, RIGHT: Primary | ICD-10-CM

## 2022-11-03 NOTE — PROGRESS NOTES
Daily Note     Today's date: 11/3/2022  Patient name: Brooke Griggs  : 1955  MRN: [id number]  Referring provider: Ashley Farris MD  Dx:   Encounter Diagnosis     ICD-10-CM    1  S/P reverse total shoulder arthroplasty, right  Z96 611                   Subjective: Pt reports improved sleep with gabapentin that was recently prescribed  Doppler was negative  She notes that she will be getting a nerve conduction study as well  Objective: See treatment diary below      Assessment: Tolerated treatment well  Patient would benefit from continued PT  Improvement in tolerance of ER and flexion PROM  Appropriate scap set in [facility]      Plan: Continue per plan of care        Precautions: DOS: 22; 4 weeks 10/18/22  DC sling at 4 weeks, max ER of 30*, no active IR, AAROM shoulder flexion to 110*  Trim suture ends to skin level POD #10    Manuals 10/25 10/28 11/1 11/3 10/5 10/7 10/10 10/12 10/18 10/20   PROM within protocol  CB CB CB CB CB CB CB CB CB   SCM STM  CB CB CB CB CB CB CB CB CB   subscap release     CB CB CB CB CB CB   Scar massage, GH distraction, forearm ext STM CB w/active stretch            Neuro Re-Ed             Kiana Flax grib w/scap retract  home           scap sets     10x10: NV       pulleys    x20                                                             Ther Ex             Supine pec stretch w/LTR CB 10x10: CB CB CB CB CB CB CB CB   UT stretch  10x10: CB CB CB CB CB CB CB CB   pendulums  CB CB CB CB CB CB CB CB CB   Assisted elbow flex/ext  home           Wrist ext/flex stretch  home           Table slides flexion  x20 CB 10x10: 15x10: NV nv 10x10: home today 15x10:   Supine wand ER  Seated x20 CB  np NV 10x10: nv nv NV   Supine wand flexion   x20 x20         Ther Activity                                       Gait Training                                       Modalities

## 2022-11-08 ENCOUNTER — OFFICE VISIT (OUTPATIENT)
Dept: PHYSICAL THERAPY | Facility: CLINIC | Age: 67
End: 2022-11-08

## 2022-11-08 DIAGNOSIS — Z96.611 S/P REVERSE TOTAL SHOULDER ARTHROPLASTY, RIGHT: Primary | ICD-10-CM

## 2022-11-08 NOTE — PROGRESS NOTES
Daily Note     Today's date: 2022  Patient name: Kirsten Dao  : 1955  MRN: [id number]  Referring provider: Ruiz Peterson MD  Dx:   Encounter Diagnosis     ICD-10-CM    1  S/P reverse total shoulder arthroplasty, right  Z96 611                   Subjective: Pt reports continued pain in the shoulder but motion and strength improving  Objective: See treatment diary below      Assessment: Tolerated treatment fair  Patient would benefit from continued PT  Continued discomfort with ER stretching, Most pain in anterior arm  Plan: Continue per plan of care        Precautions: DOS: 22; 4 weeks 10/18/22  DC sling at 4 weeks, max ER of 30*, no active IR, AAROM shoulder flexion to 110*  Trim suture ends to skin level POD #10    Manuals 10/25 10/28 11/1 11/3 11/8 10/7 10/10 10/12 10/18 10/20   PROM within protocol  CB CB CB CB CB CB CB CB CB   SCM STM  CB CB CB CB CB CB CB CB CB   subscap release     CB CB CB CB CB CB   Scar massage, GH distraction, forearm ext STM CB w/active stretch            Neuro Re-Ed             [place]tha [place] grib w/scap retract  home           scap sets     x10 NV       pulleys    x20 CB x20                                                           Ther Ex             Supine pec stretch w/LTR CB 10x10: CB CB CB CB CB CB CB CB   UT stretch  10x10: CB CB CB CB CB CB CB CB   pendulums  CB CB CB CB CB CB CB CB CB   Assisted elbow flex/ext  home           Wrist ext/flex stretch  home           Table slides flexion  x20 CB 10x10: 15x10: NV nv 10x10: home today 15x10:   Supine wand ER  Seated x20 CB   NV 10x10: nv nv NV   Supine wand flexion   x20 x20 nv        Ther Activity                                       Gait Training                                       Modalities             hwave high     10'

## 2022-11-10 ENCOUNTER — OFFICE VISIT (OUTPATIENT)
Dept: PHYSICAL THERAPY | Facility: CLINIC | Age: 67
End: 2022-11-10

## 2022-11-10 DIAGNOSIS — Z96.611 S/P REVERSE TOTAL SHOULDER ARTHROPLASTY, RIGHT: Primary | ICD-10-CM

## 2022-11-10 NOTE — PROGRESS NOTES
Daily Note     Today's date: 11/10/2022  Patient name: Natalya Alfaro  : 1955  MRN: [id number]  Referring provider: Yoanna Roche MD  Dx:   Encounter Diagnosis     ICD-10-CM    1  S/P reverse total shoulder arthroplasty, right  Z96 611                   Subjective: Pt reports continued irritation to anterior and lateral shoulder  Objective: See treatment diary below      Assessment: Tolerated treatment well  Patient would benefit from continued PT  Improved pain post visit after strengthening/control exercises  Plan: Continue per plan of care  Precautions: DOS: 22; 4 weeks 10/18/22  DC sling at 4 weeks, max ER of 30*, no active IR, AAROM shoulder flexion to 110*  Trim suture ends to skin level POD #10    Manuals 10/25 10/28 11/1 11/3 11/8 11/10 10/10 10/12 10/18 10/20   PROM within protocol  CB CB CB CB CB CB CB CB CB   SCM STM  CB CB CB CB CB CB CB CB CB   subscap release     CB  CB CB CB CB   Scar massage, GH distraction, forearm ext STM CB w/active stretch            Neuro Re-Ed             Tretha Sida grib w/scap retract  home           scap sets     x10 5:x15 RTB       pulleys    x20 CB CB       tband ext      RTB 10x10:       tband add      RTB 10x10:                                  Ther Ex             Supine pec stretch w/LTR CB 10x10: CB CB CB CB CB CB CB CB   UT stretch  10x10: CB CB CB nv CB CB CB CB   pendulums  CB CB CB CB CB CB CB CB CB   Assisted elbow flex/ext  home           Wrist ext/flex stretch  home           Table slides flexion  x20 CB 10x10: 15x10: nv nv 10x10: home today 15x10:                Supine wand ER  Seated x20 CB    10x10: nv nv NV   Supine wand flexion   x20 x20 nv CB x20      Ther Activity                                       Gait Training                                       Modalities             hwave high     10' nv

## 2022-11-15 ENCOUNTER — OFFICE VISIT (OUTPATIENT)
Dept: PHYSICAL THERAPY | Facility: CLINIC | Age: 67
End: 2022-11-15

## 2022-11-15 DIAGNOSIS — Z96.611 S/P REVERSE TOTAL SHOULDER ARTHROPLASTY, RIGHT: Primary | ICD-10-CM

## 2022-11-15 NOTE — PROGRESS NOTES
Daily Note     Today's date: 11/15/2022  Patient name: Marcos Lang  : 1955  MRN: [id number]  Referring provider: Tania Reyna MD  Dx:   Encounter Diagnosis     ICD-10-CM    1  S/P reverse total shoulder arthroplasty, right  Z96 611                   Subjective: Pt reports some improvement in pain over the weekend  She felt mobilizing into shoulder flexion lv was helpful with this  Objective: See treatment diary below      Assessment: Tolerated treatment well  Patient would benefit from continued PT  Pt able to tolerate all exercises well today  Continue to strengthen and progress AAROM as tolerated  Plan: Continue per plan of care        Precautions: DOS: 22; 4 weeks 10/18/22  DC sling at 4 weeks, max ER of 30*, no active IR, AAROM shoulder flexion to 110*  Trim suture ends to skin level POD #10    Manuals 10/25 10/28 11/1 11/3 11/8 11/10 11/15 10/12 10/18 10/20   PROM within protocol  CB CB CB CB CB CB CB CB CB   SCM STM  CB CB CB CB CB CB CB CB CB   subscap release     CB   CB CB CB   Scar massage, GH distraction, forearm ext STM CB w/active stretch            Neuro Re-Ed             Raymona Cuban grib w/scap retract  home           scap sets     x10 5:x15 RTB CB      pulleys    x20 CB CB CB      tband ext      RTB 10x10: CB      tband add      RTB 10x10: CB                                Ther Ex             Supine pec stretch w/LTR CB 10x10: CB CB CB CB CB CB CB CB   UT stretch  10x10: CB CB CB nv  CB CB CB   pendulums  CB CB CB CB CB CB CB CB CB   Assisted elbow flex/ext  home           Wrist ext/flex stretch  home           Table slides flexion  x20 CB 10x10: 15x10: nv np 10x10: home today 15x10:                Supine wand ER  Seated x20 CB     nv nv NV   Supine wand flexion   x20 x20 nv CB CB      Ther Activity                                       Gait Training                                       Modalities             hwave high     10' nv

## 2022-11-18 ENCOUNTER — OFFICE VISIT (OUTPATIENT)
Dept: PHYSICAL THERAPY | Facility: CLINIC | Age: 67
End: 2022-11-18

## 2022-11-18 DIAGNOSIS — Z96.611 S/P REVERSE TOTAL SHOULDER ARTHROPLASTY, RIGHT: Primary | ICD-10-CM

## 2022-11-18 NOTE — PROGRESS NOTES
Daily Note     Today's date: 2022  Patient name: Dian Campbell  : 1955  MRN: [id number]  Referring provider: Mariella Melendez MD  Dx:   Encounter Diagnosis     ICD-10-CM    1  S/P reverse total shoulder arthroplasty, right  Z96 611                      Subjective: Pt reports decreased shoulder pain and improved mobility  Objective: See treatment diary below      Assessment: Tolerated treatment well  Patient would benefit from continued PT  Able to achieve improved end range stretches without grabbing pain in the arm  Plan: Continue per plan of care        Precautions: DOS: 22; 4 weeks 10/18/22  DC sling at 4 weeks, max ER of 30*, no active IR, AAROM shoulder flexion to 110*  Trim suture ends to skin level POD #10    Manuals 10/25 10/28 11/1 11/3 11/8 11/10 11/15 11/18 10/18 10/20   PROM within protocol  CB CB CB CB CB CB CB CB CB   SCM STM  CB CB CB CB CB CB CB CB CB   subscap release     CB    CB CB   Scar massage, GH distraction, forearm ext STM CB w/active stretch            Neuro Re-Ed             Helyn Leech grib w/scap retract  home           scap sets     x10 5:x15 RTB CB      pulleys    x20 CB CB CB      tband ext      RTB 10x10: CB      tband add      RTB 10x10: CB                                Ther Ex             Supine pec stretch w/LTR CB 10x10: CB CB CB CB CB  CB CB   UT stretch  10x10: CB CB CB nv   CB CB   pendulums  CB CB CB CB CB CB  CB CB   Assisted elbow flex/ext  home           Wrist ext/flex stretch  home           Table slides flexion  x20 CB 10x10: 15x10: nv np 10x10: home today 15x10:                Supine wand ER  Seated x20 CB     nv nv NV   Supine wand flexion   x20 x20 CB CB CB      Ther Activity                                       Gait Training                                       Modalities             hwave high     10' nv

## 2022-11-22 ENCOUNTER — OFFICE VISIT (OUTPATIENT)
Dept: PHYSICAL THERAPY | Facility: CLINIC | Age: 67
End: 2022-11-22

## 2022-11-22 DIAGNOSIS — Z96.611 S/P REVERSE TOTAL SHOULDER ARTHROPLASTY, RIGHT: Primary | ICD-10-CM

## 2022-11-22 NOTE — PROGRESS NOTES
Daily Note     Today's date: 2022  Patient name: Dominga Casas  : 1955  MRN: [id number]  Referring provider: Joe Galvez MD  Dx:   Encounter Diagnosis     ICD-10-CM    1  S/P reverse total shoulder arthroplasty, right  Z96 611                      Subjective: Pt reports continued improvement in pain  Objective: See treatment diary below      Assessment: Tolerated treatment well  Patient would benefit from continued PT  Much improved PROM and tolerance to end range stretch  Strength is improving as well with improved scap set in AAROM  Plan: Continue per plan of care        Precautions: DOS: 22; 4 weeks 10/18/22  DC sling at 4 weeks, max ER of 30*, no active IR, AAROM shoulder flexion to 110*  Trim suture ends to skin level POD #10    Manuals 10/25 10/28 11/1 11/3 11/8 11/10 11/15 11/18 11/20 10/20   PROM within protocol  CB CB CB CB CB CB CB CB CB   SCM STM  CB CB CB CB CB CB CB CB CB   subscap release     CB    CB CB   Scar massage, GH distraction, forearm ext STM CB w/active stretch            Neuro Re-Ed             Lea Hallden grib w/scap retract  home           scap sets     x10 5:x15 RTB CB      pulleys    x20 CB CB CB CB     tband ext      RTB 10x10: CB CB     tband add      RTB 10x10: CB CB                               Ther Ex             Supine pec stretch w/LTR CB 10x10: CB CB CB CB CB   CB   UT stretch  10x10: CB CB CB nv    CB   pendulums  CB CB CB CB CB CB   CB   Assisted elbow flex/ext  home           Wrist ext/flex stretch  home           Table slides flexion  x20 CB 10x10: 15x10: nv np 10x10: CB 15x10:                Supine wand ER  Seated x20 CB     nv nv NV   Supine wand flexion   x20 x20 CB CB CB  x20; x15 seated    Ther Activity                                       Gait Training                                       Modalities             hwave high     10' nv

## 2022-11-29 ENCOUNTER — OFFICE VISIT (OUTPATIENT)
Dept: PHYSICAL THERAPY | Facility: CLINIC | Age: 67
End: 2022-11-29

## 2022-11-29 DIAGNOSIS — Z96.611 S/P REVERSE TOTAL SHOULDER ARTHROPLASTY, RIGHT: Primary | ICD-10-CM

## 2022-11-29 NOTE — PROGRESS NOTES
Daily Note     Today's date: 2022  Patient name: Eladio Asencio  : 1955  MRN: [id number]  Referring provider: Julianna Schneider MD  Dx:   Encounter Diagnosis     ICD-10-CM    1  S/P reverse total shoulder arthroplasty, right  Z96 611                      Subjective: Pt with increased lateral arm pain today but overall doing well  Objective: See treatment diary below      Assessment: Tolerated treatment well  Patient would benefit from continued PT  Pt continues to have challenge with AAROM flexion in keeping appropriate scapular mechanics but improves with cuing  She continues with limitation in multiple planes of shoulder motion and improving with HEP/manual stretching  Lateral shoulder pain was greater today than the last 2 weeks and encouraged to continue working through the exercises as she can  Plan: Continue per plan of care        Precautions: DOS: 22; 4 weeks 10/18/22  DC sling at 4 weeks, max ER of 30*, no active IR, AAROM shoulder flexion to 110*  Trim suture ends to skin level POD #10    Manuals 10/25 10/28 11/1 11/3 11/8 11/10 11/15 11/18 11/20 11/29   PROM within protocol  CB CB CB CB CB CB CB CB CB   SCM STM  CB CB CB CB CB CB CB CB CB   subscap release     CB    CB CB   Scar massage, GH distraction, forearm ext STM CB w/active stretch            Neuro Re-Ed             Lázaro becerra w/scap retract  home           scap sets     x10 5:x15 RTB CB      pulleys    x20 CB CB CB CB  CB   tband ext      RTB 10x10: CB CB  CB   tband add      RTB 10x10: CB CB  CB                             Ther Ex             Supine pec stretch w/LTR CB 10x10: CB CB CB CB CB   CB   UT stretch  10x10: CB CB CB nv    nv   pendulums  CB CB CB CB CB CB      Assisted elbow flex/ext  home           Wrist ext/flex stretch  home           Table slides flexion  x20 CB 10x10: 15x10: nv np 10x10: CB 15x10:                Supine wand ER  Seated x20 CB     nv nv    Supine wand flexion   x20 x20 CB CB CB  x20; x15 seated CB   Ther Activity                                       Gait Training                                       Modalities             hwave high     10' nv

## 2022-12-01 ENCOUNTER — EVALUATION (OUTPATIENT)
Dept: PHYSICAL THERAPY | Facility: CLINIC | Age: 67
End: 2022-12-01

## 2022-12-01 DIAGNOSIS — Z96.611 S/P REVERSE TOTAL SHOULDER ARTHROPLASTY, RIGHT: Primary | ICD-10-CM

## 2022-12-01 NOTE — PROGRESS NOTES
Re-evaluation     Today's date: 2022  Patient name: Elmo Duncan  : 1955  MRN: [id number]  Referring provider: Carlos Alberto Milton MD  Dx:   Encounter Diagnosis     ICD-10-CM    1  S/P reverse total shoulder arthroplasty, right  Z96 611                      Subjective: Pt reports 70% improvement in shoulder sxs since beginning PT  She is currently 10 weeks from DOS  Pain at worst over the last week has been 8/10 and she feels this in lateral arm  4/10 pain at best  She feels pain has increased some in the time of ending the gabapentin  Functionally she has been able to return to cleaning/cooking  She has been able to reach into a cabinet as well  She continues to feel significant stiffness and weakness with reaching as she can't lift weight at this time  She returns to Dr My Jenkins on 22  Objective: See treatment diary below  PROM R shoulder:  ER: 40*  IR:45*  Flexion:115*    AAROM: appropriate scap set supine, shoulder shrug compensation in sitting improved with cuing        Assessment: Tolerated treatment well  Patient would benefit from continued PT  Pt with continued mobility deficits limited by catching pain through lateral shoulder today  We did note about 10* additional ROM in multiple planes prior to this increased pain again  She plans to continue with HEP through the weekend and if needed, contact physician at later date about return of pain  We contacted the physician's office for an updated protocol which they will be faxing to us  At this time P/AAROM with scapular control exercises remain appropriate to return to full function  All goals improving  Patient will be able to manage symptoms independently  LT weeks  1  pt will reach foto predicted  2  pt will decrease worst pain 75%   ST weeks  1  Pt will decrease worst pain 50%  2  Patient will successfully manage HEP          Plan: Continue per plan of care        Precautions: DOS: 22; 4 weeks 10/18/22  DC sling at 4 weeks, max ER of 30*, no active IR, AAROM shoulder flexion to 110*  Trim suture ends to skin level POD #10    Manuals 12/1 10/28 11/1 11/3 11/8 11/10 11/15 11/18 11/20 11/29   PROM within protocol CB CB CB CB CB CB CB CB CB CB   SCM STM CB CB CB CB CB CB CB CB CB CB   subscap release CB    CB    CB CB   Scar massage, GH distraction, forearm ext STM             Neuro Re-Ed             scap sets w/tband at wrists nv    x10 5:x15 RTB CB      pulleys    x20 CB CB CB CB  CB   tband ext GTB x15     RTB 10x10: CB CB  CB   tband add GTB x15     RTB 10x10: CB CB  CB                             Ther Ex             Supine pec stretch w/LTR 10x10: 10x10: CB CB CB CB CB   CB   UT stretch nv 10x10: CB CB CB nv    nv   Table slides flexion CB x20 CB 10x10: 15x10: nv np 10x10: CB 15x10:                Supine wand ER nv Seated x20 CB     nv nv    Ther Activity                                       Gait Training                                       Modalities             hwave high     10' nv

## 2022-12-01 NOTE — LETTER
2022    MD Jamison Chirinos [address]    Patient: Zeny Light   YOB: 1955   Date of Visit: 2022     Encounter Diagnosis     ICD-10-CM    1  S/P reverse total shoulder arthroplasty, right  Z96 611           Dear Dr Adonay Peña: Thank you for your recent referral of Zeny Light  Please review the attached evaluation summary from Kendal's recent visit  Please verify that you agree with the plan of care by signing the attached order  If you have any questions or concerns, please do not hesitate to call  I sincerely appreciate the opportunity to share in the care of one of your patients and hope to have another opportunity to work with you in the near future  Sincerely,    Travis Pathak PT      Referring Provider:      I certify that I have read the below Plan of Care and certify the need for these services furnished under this plan of treatment while under my care  MD Jamison Chirinos [address]  Via Fax: [phone]          Re-evaluation     Today's date: 2022  Patient name: Zeny Light  : 1955  MRN: [id number]  Referring provider: Kylie Ballard MD  Dx:   Encounter Diagnosis     ICD-10-CM    1  S/P reverse total shoulder arthroplasty, right  Z96 611                      Subjective: Pt reports 70% improvement in shoulder sxs since beginning PT  She is currently 10 weeks from DOS  Pain at worst over the last week has been 8/10 and she feels this in lateral arm  4/10 pain at best  She feels pain has increased some in the time of ending the gabapentin  Functionally she has been able to return to cleaning/cooking  She has been able to reach into a cabinet as well  She continues to feel significant stiffness and weakness with reaching as she can't lift weight at this time  She returns to Dr Adonay Peña on 22         Objective: See treatment diary below  PROM R shoulder:  ER: 40*  IR:45*  Flexion:115*    AAROM: appropriate scap set supine, shoulder shrug compensation in sitting improved with cuing        Assessment: Tolerated treatment well  Patient would benefit from continued PT  Pt with continued mobility deficits limited by catching pain through lateral shoulder today  We did note about 10* additional ROM in multiple planes prior to this increased pain again  She plans to continue with HEP through the weekend and if needed, contact physician at later date about return of pain  We contacted the physician's office for an updated protocol which they will be faxing to us  At this time P/AAROM with scapular control exercises remain appropriate to return to full function  All goals improving  Patient will be able to manage symptoms independently  LT weeks  1  pt will reach foto predicted  2  pt will decrease worst pain 75%   ST weeks  1  Pt will decrease worst pain 50%  2  Patient will successfully manage HEP          Plan: Continue per plan of care       Precautions: DOS: 22; 4 weeks 10/18/22  DC sling at 4 weeks, max ER of 30*, no active IR, AAROM shoulder flexion to 110*  Trim suture ends to skin level POD #10    Manuals 12/1 10/28 11/1 11/3 11/8 11/10 11/15 11/18 11/20 11/29   PROM within protocol CB CB CB CB CB CB CB CB CB CB   SCM STM CB CB CB CB CB CB CB CB CB CB   subscap release CB    CB    CB CB   Scar massage, GH distraction, forearm ext STM             Neuro Re-Ed             scap sets w/tband at wrists nv    x10 5:x15 RTB CB      pulleys    x20 CB CB CB CB  CB   tband ext GTB x15     RTB 10x10: CB CB  CB   tband add GTB x15     RTB 10x10: CB CB  CB                             Ther Ex             Supine pec stretch w/LTR 10x10: 10x10: CB CB CB CB CB   CB   UT stretch nv 10x10: CB CB CB nv    nv   Table slides flexion CB x20 CB 10x10: 15x10: nv np 10x10: CB 15x10:                Supine wand ER nv Seated x20 CB     nv nv    Ther Activity                                       Gait Training                                       Modalities             hwave high     10' nv

## 2022-12-05 ENCOUNTER — OFFICE VISIT (OUTPATIENT)
Dept: PHYSICAL THERAPY | Facility: CLINIC | Age: 67
End: 2022-12-05

## 2022-12-05 DIAGNOSIS — Z96.611 S/P REVERSE TOTAL SHOULDER ARTHROPLASTY, RIGHT: Primary | ICD-10-CM

## 2022-12-05 NOTE — PROGRESS NOTES
Daily Note     Today's date: 2022  Patient name: Adan Dee  : 1955  MRN: [id number]  Referring provider: Yossi Jovel MD  Dx:   Encounter Diagnosis     ICD-10-CM    1  S/P reverse total shoulder arthroplasty, right  Z96 611                      Subjective: Pt reports increase in pain near the coracoid process running down the arm into the bicep on the RUE  She has noticed she is able to do more around the house with less restriction  Objective: See treatment diary below      Assessment: Attempted STM and stretching to the pec/ bicep was added with minimal improvement  She is progressing in the end stages of her protocol and new interventions will be added as tolerated next visit  She will continue to progress towards improved function  Plan: Continue per plan of care        Precautions: DOS: 22; 4 weeks 10/18/22  DC sling at 4 weeks, max ER of 30*, no active IR, AAROM shoulder flexion to 110*  Trim suture ends to skin level POD #10    Manuals 12/1 12/5 11/1 11/3 11/8 11/10 11/15 11/18 11/20 11/29   PROM within protocol CB [address] CB CB CB CB CB CB CB CB   SCM STM CB [address] CB CB CB CB CB CB CB CB   subscap release CB [address]   CB    CB CB   Scar massage, GH distraction, forearm ext STM             Neuro Re-Ed             scap sets w/tband at wrists nv    x10 5:x15 RTB CB      pulleys  5' abd/flex  x20 CB CB CB CB  CB   tband ext GTB x15 GTB 10x10"    RTB 10x10: CB CB  CB   tband add GTB x15 GTB 10x10"    RTB 10x10: CB CB  CB   tband row  GTB 10x10"                        Ther Ex             Supine pec stretch w/LTR 10x10: 10x10" CB CB CB CB CB   CB   UT stretch nv  CB CB CB nv    nv   Table slides flexion CB  CB 10x10: 15x10: nv np 10x10: CB 15x10:                Supine wand ER nv 10x5" CB     nv nv    Ther Activity                                       Gait Training                                       Modalities             hwave high     10' nv

## 2022-12-08 ENCOUNTER — OFFICE VISIT (OUTPATIENT)
Dept: PHYSICAL THERAPY | Facility: CLINIC | Age: 67
End: 2022-12-08

## 2022-12-08 DIAGNOSIS — Z96.611 S/P REVERSE TOTAL SHOULDER ARTHROPLASTY, RIGHT: Primary | ICD-10-CM

## 2022-12-08 NOTE — PROGRESS NOTES
Re-evaluation     Today's date: 2022  Patient name: Armani Sanchez  : 1955  MRN: [id number]  Referring provider: Gina Peralta MD  Dx:   Encounter Diagnosis     ICD-10-CM    1  S/P reverse total shoulder arthroplasty, right  Z96 611                      Subjective: Pt reports 70% improvement in shoulder sxs since beginning PT  She is currently 10 weeks from DOS  Pain at worst over the last week has been 8/10 and she feels this in lateral arm  4/10 pain at best  She feels pain has increased some in the time of ending the gabapentin  Functionally she has been able to return to cleaning/cooking  She has been able to reach into a cabinet as well  She continues to feel significant stiffness and weakness with reaching as she can't lift weight at this time  She returns to Dr Alexsander Lopez on 22: pt noted no pain to start session and believes she is noticing increase in strength and tolerance to lifting arm independently to shoulder level  Objective: See treatment diary below  PROM R shoulder:  ER: 38 at 45 degrees  IR:30 at 45  Flexion:120    AROM:   Flex: 102  Abd: 94  IR BTB sacrum   ER: 30 at 0 abd       Assessment: Tolerated treatment well  Patient would benefit from continued PT  Pt with continued mobility deficits limited by catching pain through lateral shoulder today  We did note about 10* additional ROM in multiple planes prior to this increased pain again  She plans to continue with HEP through the weekend and if needed, contact physician at later date about return of pain  We contacted the physician's office for an updated protocol which they will be faxing to us  At this time P/AAROM with scapular control exercises remain appropriate to return to full function  All goals improving  Patient will be able to manage symptoms independently  LT weeks  1  pt will reach foto predicted   2  pt will decrease worst pain 75%   ST weeks  1  Pt will decrease worst pain 50%   2   Patient will successfully manage HEP  3  Patient will achieve full AROM per protocol  4  Improve strength to 4/5 without pain in 4 weeks  Plan: Continue per plan of care        Precautions: DOS: 9/20/22; 4 weeks 10/18/22  DC sling at 4 weeks, max ER of 30*, no active IR, AAROM shoulder flexion to 110*  Trim suture ends to skin level POD #10    Manuals 12/1 12/5 12/8  11/8 11/10 11/15 11/18 11/20 11/29   PROM within protocol CB [address]  CB CB CB CB CB CB   SCM STM CB [address]   CB CB CB CB CB CB   subscap release CB [address]   CB    CB CB   Scar massage, GH distraction, forearm ext STM             Neuro Re-Ed             scap sets w/tband at wrists nv    x10 5:x15 RTB CB      pulleys  5' abd/flex [address] AROM  CB CB CB CB  CB   tband ext GTB x15 GTB 10x10" [address]   RTB 10x10: CB CB  CB   tband add GTB x15 GTB 10x10" [address]   RTB 10x10: CB CB  CB   tband row  GTB 10x10" [address]          Tband IR   2x10 GTB          tband ER walk out   10x  GTB          Ther Ex             30 degrees pec stretch door   20"x5           UT stretch nv    CB nv    nv   Prone I, T, & Y   2x10 T/I, Y NV          Standing repetitive flex/scap watch compensation   2x10           Supine wand ER nv 10x5" DC     nv nv    Ther Activity                                       Gait Training                                       Modalities             hwave high     10' nv

## 2022-12-08 NOTE — PROGRESS NOTES
Daily Note     Today's date: 2022  Patient name: Parker Restrepo  : 1955  MRN: [id number]  Referring provider: Segun Jacobson MD  Dx: No diagnosis found  Subjective: ***      Objective: See treatment diary below      Assessment: Tolerated treatment {Tolerated treatment :}   Patient {assessment:[id number]}      Plan: {PLAN:[id number]}     Precautions: DOS: 22; 4 weeks 10/18/22  DC sling at 4 weeks, max ER of 30*, no active IR, AAROM shoulder flexion to 110*  Trim suture ends to skin level POD #10    Manuals 12/1 12/5 11/1 11/3 11/8 11/10 11/15 11/18 11/20 11/29   PROM within protocol CB [address] CB CB CB CB CB CB CB CB   SCM STM CB  [address] CB CB CB CB CB CB CB CB   subscap release CB  [address]   CB    CB CB   Scar massage, GH distraction, forearm ext STM             Neuro Re-Ed             scap sets w/tband at wrists nv    x10 5:x15 RTB CB      pulleys  5' abd/flex  x20 CB CB CB CB  CB   tband ext GTB x15 GTB 10x10"    RTB 10x10: CB CB  CB   tband add GTB x15 GTB 10x10"    RTB 10x10: CB CB  CB   tband row  GTB 10x10"                        Ther Ex             Supine pec stretch w/LTR 10x10: 10x10" CB CB CB CB CB   CB   UT stretch nv  CB CB CB nv    nv   Table slides flexion CB  CB 10x10: 15x10: nv np 10x10: CB 15x10:                Supine wand ER nv 10x5" CB     nv nv    Ther Activity                                       Gait Training                                       Modalities             hwave high     10' nv

## 2022-12-12 ENCOUNTER — OFFICE VISIT (OUTPATIENT)
Dept: PHYSICAL THERAPY | Facility: CLINIC | Age: 67
End: 2022-12-12

## 2022-12-12 DIAGNOSIS — Z96.611 S/P REVERSE TOTAL SHOULDER ARTHROPLASTY, RIGHT: Primary | ICD-10-CM

## 2022-12-12 NOTE — PROGRESS NOTES
Daily Note     Today's date: 2022  Patient name: Nancy Cr  : 1955  MRN: [id number]  Referring provider: Beronica Garcia MD  Dx:   Encounter Diagnosis     ICD-10-CM    1  S/P reverse total shoulder arthroplasty, right  Z96 611                      Subjective: Pt is doing well today, she did experience increased pain in the bicep/ anterior shoulder after last visit  However, notes she is moving more everyday and believes she is ready to discharge after this week  Objective: See treatment diary below      Assessment: Pt did well today and required to continue working on compensation techniques while lifting the arm into elevation  She is understanding of how she is to progress and was given a print out her interventions  She will return next visit with any questions or concerns to discharge after  Plan: Potential DC next visit        Precautions: DOS: 22      Manuals 12/1 12/5 12/8 12/12  11/10 11/15 11/18 11/20 11/29   PROM within protocol CB  [facility] [address]  [facility] [address]   CB CB CB CB CB   SCM STM CB  [facility] [address]    CB CB CB CB CB   subscap release CB  [address]       CB CB   Scar massage, GH distraction, forearm ext STM             Neuro Re-Ed             scap sets w/tband at wrists nv     5:x15 RTB CB      pulleys  5' abd/flex  [facility] [address] AROM UBE 5'  CB CB CB  CB   tband ext GTB x15 GTB 10x10"  [address]  RTB 10x10: CB CB  CB   tband add GTB x15 GTB 10x10"  [address] [facility] [address]  RTB 10x10: CB CB  CB   tband row  GTB 10x10"  [facility] [address] [facility] [address]         Tband IR   2x10 GTB  [facility] [address]         tband ER walk out   10x  GTB  [address]         Ther Ex             30 degrees pec stretch door   20"x5   [address]         UT stretch nv     nv    nv   Prone I, T, & Y   2x10 T/I, Y NV HEP         Standing repetitive flex/scap watch compensation   2x10  2x10          Supine wand ER nv 10x5" DC     nv nv    Ther Activity                                       Gait Training                                       Modalities             hwave high      nv

## 2022-12-15 ENCOUNTER — OFFICE VISIT (OUTPATIENT)
Dept: PHYSICAL THERAPY | Facility: CLINIC | Age: 67
End: 2022-12-15

## 2022-12-15 DIAGNOSIS — Z96.611 S/P REVERSE TOTAL SHOULDER ARTHROPLASTY, RIGHT: Primary | ICD-10-CM

## 2022-12-15 NOTE — PROGRESS NOTES
Daily Note     Today's date: 12/15/2022  Patient name: Elbert Tillman  : 1955  MRN: [id number]  Referring provider: Jovan Sherwood MD  Dx:   Encounter Diagnosis     ICD-10-CM    1  S/P reverse total shoulder arthroplasty, right  Z96 611                      Subjective: Pt reports she is continuing to progress without any difficulty  She notes that the pain is close to fully subsided and is ready to continue progressing (I)  She is happy with her progress so far  Objective: See treatment diary below      Assessment: Pt did well today and reviewed a few of her interventions for home, and stated no limitations with her current plan of discharge  She is doing well today with minimal limitations with function  Continue at home and if she has any questions or concerns to call or return        Plan: DC post visit     Precautions: DOS: 22      Manuals 12/1 12/5 12/8 12/12 12/15  11/15 11/18 11/20 11/29   PROM within protocol CB  [facility] [address]  [facility] [address]    CB CB CB CB   SCM STM CB  [facility] [address]     CB CB CB CB   subscap release CB  [facility] [address]       CB CB   Scar massage, GH distraction, forearm ext STM             Neuro Re-Ed             scap sets w/tband at wrists nv      CB      pulleys  5' abd/flex  [facility] [address] inc AROM UBE 5' UBE 6'  CB CB  CB   tband ext GTB x15 GTB 10x10"  [facility] [address]  [facility] [address] BTB 3x10  CB CB  CB   tband add GTB x15 GTB 10x10"  [facility] [address]  [facility] [address] BTB 3x10  CB CB  CB   tband row  GTB 10x10"  [facility] [address]  [facility] [address] BTB 3x10        Tband IR   2x10 GTB  [facility] [address]         tband ER walk out   10x  GTB  [facility] [address] BTB 15x        Ther Ex             Ball on wall alphabet     1x lacrosse ball        30 degrees pec stretch door   20"x5   [facility] [address]         UT stretch nv         nv   Prone I, T, & Y   2x10 T/I, Y NV HEP         Standing repetitive flex/scap watch compensation   2x10  2x10          Supine wand ER nv 10x5" DC     nv nv    Ther Activity                                       Gait Training                                       Modalities             hwave high

## 2022-12-20 ENCOUNTER — APPOINTMENT (OUTPATIENT)
Dept: PHYSICAL THERAPY | Facility: CLINIC | Age: 67
End: 2022-12-20

## 2022-12-22 ENCOUNTER — APPOINTMENT (OUTPATIENT)
Dept: PHYSICAL THERAPY | Facility: CLINIC | Age: 67
End: 2022-12-22

## 2023-03-18 NOTE — PROGRESS NOTES
Daily Note     Today's date: 2021  Patient name: Mitch Mclaughlin  : 1955  MRN: [id number]  Referring provider: Bogdan Crandall*  Dx:   Encounter Diagnosis     ICD-10-CM    1  Right shoulder pain, unspecified chronicity  M25 511                   Subjective: Reports increased pain overall  Objective: See treatment diary below      Assessment: Tolerated treatment well  Patient would benefit from continued PT  Updated Hep and advised to perform 5x per day or more    Plan: Progress treatment as tolerated         Precautions: none      Manuals           Shoulder PROM  CB Nd ND          STM to axilla  ND ND          Inferior and posterior  GH joint mobs  ND ND                       Neuro Re-Ed             Upper thoracic extesion stretch over pillow fold with LTR  5'/  20x ND          pulleys  10x10" ND          Supine posterior capsule stretch  5x30" ND          Table slide flexion   20x10"          Table slide ER   20x10"                                    Ther Ex             TERT supine Flexion  ER w/abd 90* and 45* 10'   home           Table slides  NV           ER table stretch  NV                                                                            Ther Activity                                       Gait Training                                       Modalities
[FreeTextEntry1] : 1.  Hematemesis, acute blood loss anemia secondary to ulcers diagnosed on EGD at OSH 1.5 weeks prior.\par 2.  Prior normocytic anemia, possibly due to CKD.  Rule out colon lesion.  No prior colonoscopy.\par 3.  CKD.\par 4.  History of CVA.\par 5.  HTN.\par 6.  Diabetes mellitus.\par \par Recs:\par - Patient was advised to obtain recent EGD report and biopsy results for review (states information on portal).\par - Continue PPI BID for one month.  Can taper to once daily after that.\par - Start oral iron for 1-2 months.\par - Patient was advised to undergo colonoscopy and possible repeat EGD in 2-3 months to allow ulcer healing- procedure, risks, benefits, and alternatives were explained. Patient agreeable. Brochure given. Gol[employer] prep.  Follow up telephone visit in 1-2 months prior to scheduling procedures.
no

## 2023-10-31 ENCOUNTER — EVALUATION (OUTPATIENT)
Dept: PHYSICAL THERAPY | Facility: CLINIC | Age: 68
End: 2023-10-31

## 2023-10-31 DIAGNOSIS — M25.511 CHRONIC RIGHT SHOULDER PAIN: ICD-10-CM

## 2023-10-31 DIAGNOSIS — Z96.611 STATUS POST REVERSE TOTAL REPLACEMENT OF RIGHT SHOULDER: Primary | ICD-10-CM

## 2023-10-31 DIAGNOSIS — G89.29 CHRONIC RIGHT SHOULDER PAIN: ICD-10-CM

## 2023-10-31 PROCEDURE — 97162 PT EVAL MOD COMPLEX 30 MIN: CPT | Performed by: PHYSICAL THERAPIST

## 2023-10-31 NOTE — PROGRESS NOTES
PT Evaluation     Today's date: 10/31/2023  Patient name: Lisa Griffith  : 1955  MRN: [id number]  Referring provider: Rolf Hayes MD  Dx:   Encounter Diagnosis     ICD-10-CM    1. Status post reverse total replacement of right shoulder  Z96.611       2. Chronic right shoulder pain  M25.511     G89.29                      Assessment  Assessment details: Lisa Griffith is a pleasant 74 y.o. female presents with right shoulder pain and hx of R reverse TSA. No further referral appears necessary at this time. Skilled PT services appropriate to facilitate return to prior level of function with transition to home exercise program for independent management when appropriate. Impairments: abnormal muscle firing, abnormal muscle tone, abnormal or restricted ROM, impaired physical strength, lacks appropriate home exercise program and pain with function  Understanding of Dx/Px/POC: good   Prognosis: good    Goals  Patient will successfully transition to home exercise program.  Patient will be able to manage symptoms independently. Plan  Patient would benefit from: skilled PT  Referral necessary: No  Planned modality interventions: thermotherapy: hydrocollator packs  Planned therapy interventions: home exercise program, manual therapy, neuromuscular re-education, patient education, functional ROM exercises, strengthening, stretching, joint mobilization, graded activity, graded exercise, therapeutic exercise, body mechanics training, motor coordination training and activity modification  Frequency: 2x week  Duration in weeks: 12  Treatment plan discussed with: patient        Subjective Evaluation    History of Present Illness  Date of surgery: 2022  Mechanism of injury: Pt had reverse TSA about 1 year ago and notes continued significant pain in the lateral arm. Pain increases with activity such as reaching and lifting.  She denies night pain but does often wake up with some hand paresthesia that improves quickly. She notes the shoulder fatigues quickly as well.   Pain  Current pain ratin  At best pain ratin  At worst pain ratin  Quality: sharp and dull ache    Treatments  Current treatment: physical therapy        Objective     Active Range of Motion     Right Shoulder   Flexion: 100 degrees with pain    Passive Range of Motion     Right Shoulder   Flexion: 150 degrees   External rotation 45°: 50 degrees with pain  Internal rotation 45°: 60 degrees     Strength/Myotome Testing     Left Shoulder     Planes of Motion   Flexion: 5     Right Shoulder     Planes of Motion   Flexion: 3+   External rotation BTH: 4-   Internal rotation at 0°: 4              Precautions: hx of R reverse TSA    Manuals 10/31            jacquelin HURTADO STM CB                                                   Neuro Re-Ed             Tband extension 10x10:            scaption nv            Tband forward press nv            SL ER nv            Bicep curl nv                                      Ther Ex             Flexion table slides reviewed            ER table stretch reviewed            Jacquelin towel stretch nv                                                                             Ther Activity                                       Gait Training                                       Modalities

## 2023-10-31 NOTE — LETTER
2023      No Recipients    Patient: Juan R Wade   YOB: 1955   Date of Visit: 10/31/2023     Encounter Diagnosis     ICD-10-CM    1. Status post reverse total replacement of right shoulder  Z96.611       2. Chronic right shoulder pain  M25.511     G89.29           Dear Dr. Dianne Frances: Thank you for your recent referral of Juan R Wade. Please review the attached evaluation summary from Kendal's recent visit. Please verify that you agree with the plan of care by signing the attached order. If you have any questions or concerns, please do not hesitate to call. I sincerely appreciate the opportunity to share in the care of one of your patients and hope to have another opportunity to work with you in the near future. Sincerely,    Toney Dash, PT      Referring Provider:      I certify that I have read the below Plan of Care and certify the need for these services furnished under this plan of treatment while under my care. Mustapha Martinez MD  [address] [place] [address]  Via Fax: [phone]          PT Evaluation     Today's date: 10/31/2023  Patient name: Juan R Wade  : 1955  MRN: [id number]  Referring provider: Mustapha Martinez MD  Dx:   Encounter Diagnosis     ICD-10-CM    1. Status post reverse total replacement of right shoulder  Z96.611       2. Chronic right shoulder pain  M25.511     G89.29                      Assessment  Assessment details: Juan R Wade is a pleasant 74 y.o. female presents with right shoulder pain and hx of R reverse TSA. No further referral appears necessary at this time. Skilled PT services appropriate to facilitate return to prior level of function with transition to home exercise program for independent management when appropriate.     Impairments: abnormal muscle firing, abnormal muscle tone, abnormal or restricted ROM, impaired physical strength, lacks appropriate home exercise program and pain with function  Understanding of Dx/Px/POC: good   Prognosis: good    Goals  Patient will successfully transition to home exercise program.  Patient will be able to manage symptoms independently. Plan  Patient would benefit from: skilled PT  Referral necessary: No  Planned modality interventions: thermotherapy: hydrocollator packs  Planned therapy interventions: home exercise program, manual therapy, neuromuscular re-education, patient education, functional ROM exercises, strengthening, stretching, joint mobilization, graded activity, graded exercise, therapeutic exercise, body mechanics training, motor coordination training and activity modification  Frequency: 2x week  Duration in weeks: 12  Treatment plan discussed with: patient        Subjective Evaluation    History of Present Illness  Date of surgery: 2022  Mechanism of injury: Pt had reverse TSA about 1 year ago and notes continued significant pain in the lateral arm. Pain increases with activity such as reaching and lifting. She denies night pain but does often wake up with some hand paresthesia that improves quickly. She notes the shoulder fatigues quickly as well.   Pain  Current pain ratin  At best pain ratin  At worst pain ratin  Quality: sharp and dull ache    Treatments  Current treatment: physical therapy        Objective     Active Range of Motion     Right Shoulder   Flexion: 100 degrees with pain    Passive Range of Motion     Right Shoulder   Flexion: 150 degrees   External rotation 45°: 50 degrees with pain  Internal rotation 45°: 60 degrees     Strength/Myotome Testing     Left Shoulder     Planes of Motion   Flexion: 5     Right Shoulder     Planes of Motion   Flexion: 3+   External rotation BTH: 4-   Internal rotation at 0°: 4              Precautions: hx of R reverse TSA    Manuals 10/31            christ HURTADO STM CB                                                   Neuro Re-Ed             [facility] extension 10x10:            scaption nv            Tband forward press nv            SL ER nv            Bicep curl nv                                      Ther Ex             Flexion table slides reviewed            ER table stretch reviewed            Scalene towel stretch nv                                                                             Ther Activity                                       Gait Training                                       Modalities

## 2023-11-08 ENCOUNTER — OFFICE VISIT (OUTPATIENT)
Dept: PHYSICAL THERAPY | Facility: CLINIC | Age: 68
End: 2023-11-08

## 2023-11-08 DIAGNOSIS — Z96.611 STATUS POST REVERSE TOTAL REPLACEMENT OF RIGHT SHOULDER: Primary | ICD-10-CM

## 2023-11-08 PROCEDURE — 97112 NEUROMUSCULAR REEDUCATION: CPT | Performed by: PHYSICAL THERAPIST

## 2023-11-08 NOTE — PROGRESS NOTES
Daily Note     Today's date: 2023  Patient name: Socorro Yusuf  : 1955  MRN: [id number]  Referring provider: Aydin Joy MD  Dx:   Encounter Diagnosis     ICD-10-CM    1. Status post reverse total replacement of right shoulder  Z96.611                      Subjective: Pt reports some increased soreness in the shoulder after stretching. Objective: See treatment diary below      Assessment: Tolerated treatment well. Patient would benefit from continued PT. Educated on further strengthening for home. Plan: Continue per plan of care.       Precautions: hx of R reverse TSA    Manuals 10/31 11/8           jacquelin HURTADO STM CB            R shoulder PROM  CB                                     Neuro Re-Ed             Tband extension 10x10: 10x10:                                     SL ER nv 2# 2x10           Bicep curl nv            pulleys  x10                        Ther Ex             Flexion table slides reviewed 10x10:           ER table stretch reviewed 10x10:           Jacquelin towel stretch nv nv           Supine dumbell press  2# 2x10                                                               Ther Activity                                       Gait Training                                       Modalities

## 2023-11-15 ENCOUNTER — OFFICE VISIT (OUTPATIENT)
Dept: PHYSICAL THERAPY | Facility: CLINIC | Age: 68
End: 2023-11-15

## 2023-11-15 DIAGNOSIS — Z96.611 STATUS POST REVERSE TOTAL REPLACEMENT OF RIGHT SHOULDER: Primary | ICD-10-CM

## 2023-11-15 PROCEDURE — 97112 NEUROMUSCULAR REEDUCATION: CPT | Performed by: PHYSICAL THERAPIST

## 2023-11-15 NOTE — PROGRESS NOTES
Daily Note     Today's date: 11/15/2023  Patient name: Essence Bejarano  : 1955  MRN: [id number]  Referring provider: Elvia Plasencia MD  Dx:   Encounter Diagnosis     ICD-10-CM    1. Status post reverse total replacement of right shoulder  Z96.611                      Subjective: Pt reports some increased soreness in the shoulder recently and attributes to lots of activity around her house. Objective: See treatment diary below  EFT self chosen posture: , breaks at R scap  EFT corrected posture: 3 breaks at R scap  Left Extension LPM:  self (breaks at lumbar); 3 corrected    Assessment: Tolerated treatment well. Patient would benefit from continued PT. Educated on correcting lumbopelvic positioning with improvement in functional strength objectively and subjectively. Plan: Continue per plan of care.       Precautions: hx of R reverse TSA    Manuals 10/31 11/8 11/15          LV, scalene STM CB            R shoulder PROM  CB CB                                    Neuro Re-Ed             Tband extension 10x10: 10x10: 10x10: BTB          Med ball reach   4# x15          Sammy forward press   2x10          SL ER nv 2# 2x10 CB          Bicep curl nv            pulleys  x10                        Ther Ex             Flexion table slides reviewed 10x10:           ER table stretch reviewed 10x10:           Scalene towel stretch nv nv           Supine dumbell press  2# 2x10 CB                                                              Ther Activity                                       Gait Training                                       Modalities

## 2023-11-21 ENCOUNTER — OFFICE VISIT (OUTPATIENT)
Dept: PHYSICAL THERAPY | Facility: CLINIC | Age: 68
End: 2023-11-21

## 2023-11-21 DIAGNOSIS — Z96.611 STATUS POST REVERSE TOTAL REPLACEMENT OF RIGHT SHOULDER: Primary | ICD-10-CM

## 2023-11-21 PROCEDURE — 97112 NEUROMUSCULAR REEDUCATION: CPT | Performed by: PHYSICAL THERAPIST

## 2023-11-21 NOTE — PROGRESS NOTES
Daily Note     Today's date: 2023  Patient name: Jose Antonio Emery  : 1955  MRN: [id number]  Referring provider: Derik Palacios MD  Dx:   Encounter Diagnosis     ICD-10-CM    1. Status post reverse total replacement of right shoulder  Z96.611                      Subjective: Pt reports some increased soreness for 24 hours after lv but returned to baseline thereafter. Objective: See treatment diary below      Assessment: Tolerated treatment well. Patient would benefit from continued PT. Able to progress strengthening well with minimal complaints. Plan: Continue per plan of care.       Precautions: hx of R reverse TSA    Manuals 10/31 11/8 11/15 11/21         LV, scalene STM CB            R shoulder PROM  CB CB CB                                   Neuro Re-Ed             Tband extension 10x10: 10x10: 10x10: BTB 2x10 BTB         Med ball reach   4# x15 4# x15         Chama forward press   2x10 2x10 15#         SL ER nv 2# 2x10 CB 2x10         Bicep curl nv   10# x10         Tricep extension    10# juan 2x10         Bent over row    Chama 2x10 12#         Ther Ex             Flexion table slides reviewed 10x10:  home         ER table stretch reviewed 10x10:  home         Scalene towel stretch nv nv           Supine dumbell press  2# 2x10 CB CB                                                             Ther Activity                                       Gait Training                                       Modalities

## 2023-11-29 ENCOUNTER — APPOINTMENT (OUTPATIENT)
Dept: PHYSICAL THERAPY | Facility: CLINIC | Age: 68
End: 2023-11-29

## 2023-12-06 ENCOUNTER — OFFICE VISIT (OUTPATIENT)
Dept: PHYSICAL THERAPY | Facility: CLINIC | Age: 68
End: 2023-12-06

## 2023-12-06 DIAGNOSIS — Z96.611 STATUS POST REVERSE TOTAL REPLACEMENT OF RIGHT SHOULDER: Primary | ICD-10-CM

## 2023-12-06 PROCEDURE — 97112 NEUROMUSCULAR REEDUCATION: CPT | Performed by: PHYSICAL THERAPIST

## 2023-12-06 NOTE — PROGRESS NOTES
Daily Note     Today's date: 2023  Patient name: Jose Antonio Emery  : 1955  MRN: [id number]  Referring provider: Derik Palacios MD  Dx: No diagnosis found. Subjective: Pt reports soreness for a few hours after PT. Objective: See treatment diary below      Assessment: Tolerated treatment well. Patient would benefit from continued PT. Strength and control in R shoulder is improving well. Plan: Continue per plan of care.       Precautions: hx of R reverse TSA    Manuals 10/31 11/8 11/15 11/21 12        LV, scalene STM CB            R shoulder PROM  CB CB CB CB                                  Neuro Re-Ed             Tband extension 10x10: 10x10: 10x10: BTB 2x10 BTB CB        Med ball reach   4# x15 4# x15 CB        Sammy forward press   2x10 2x10 15# CB        SL ER nv 2# 2x10 CB 2x10 2x10 GTB        Bicep curl nv   10# x10 CB        Tricep extension    10# sammy 2x10 CB        Bent over row    Sammy 2x10 12# CB        Ther Ex             Flexion table slides reviewed 10x10:  home         ER table stretch reviewed 10x10:  home         Scalene towel stretch nv nv           Supine dumbell press  2# 2x10 CB CB 2x10 6#                                                            Ther Activity                                       Gait Training                                       Modalities

## 2023-12-12 ENCOUNTER — OFFICE VISIT (OUTPATIENT)
Dept: PHYSICAL THERAPY | Facility: CLINIC | Age: 68
End: 2023-12-12

## 2023-12-12 DIAGNOSIS — Z96.611 STATUS POST REVERSE TOTAL REPLACEMENT OF RIGHT SHOULDER: Primary | ICD-10-CM

## 2023-12-12 PROCEDURE — 97112 NEUROMUSCULAR REEDUCATION: CPT | Performed by: PHYSICAL THERAPIST

## 2023-12-12 NOTE — PROGRESS NOTES
Daily Note     Today's date: 2023  Patient name: Darlene Etienne  : 1955  MRN: [id number]  Referring provider: Nisha Dyer MD  Dx:   Encounter Diagnosis     ICD-10-CM    1. Status post reverse total replacement of right shoulder  Z96.611                      Subjective: Pt notes no new complaints. Objective: See treatment diary below      Assessment: Tolerated treatment well. Patient would benefit from continued PT. Continue to strengthen as tolerated. Plan: Continue per plan of care.       Precautions: hx of R reverse TSA    Manuals 10/31 11/8 11/15 11/21 12/6 12/12       LV, scalene STM CB            R shoulder PROM  CB CB CB CB CB                                 Neuro Re-Ed             Tband extension 10x10: 10x10: 10x10: BTB 2x10 BTB CB CB       Med ball reach   4# x15 4# x15 CB 4# 2x10       Sammy forward press   2x10 2x10 15# CB 15# 2x10       SL ER nv 2# 2x10 CB 2x10 2x10 GTB CB       Bicep curl nv   10# x10 CB CB       Tricep extension    10# sammy 2x10 CB CB       Bent over row    Sammy 2x10 12# CB CB       Ther Ex             Flexion table slides reviewed 10x10:  home         ER table stretch reviewed 10x10:  home         Scalene towel stretch nv nv           Supine dumbell press  2# 2x10 CB CB 2x10 6# CB                                                           Ther Activity                                       Gait Training                                       Modalities

## 2023-12-19 ENCOUNTER — OFFICE VISIT (OUTPATIENT)
Dept: PHYSICAL THERAPY | Facility: CLINIC | Age: 68
End: 2023-12-19

## 2023-12-19 DIAGNOSIS — Z96.611 STATUS POST REVERSE TOTAL REPLACEMENT OF RIGHT SHOULDER: Primary | ICD-10-CM

## 2023-12-19 PROCEDURE — 97112 NEUROMUSCULAR REEDUCATION: CPT | Performed by: PHYSICAL THERAPIST

## 2023-12-19 NOTE — PROGRESS NOTES
Daily Note     Today's date: 2023  Patient name: Kendal Clemente  : 1955  MRN: [id number]  Referring provider: Lucio Cottrell MD  Dx:   Encounter Diagnosis     ICD-10-CM    1. Status post reverse total replacement of right shoulder  Z96.611                      Subjective: Pt reports improvement in pain and function of her shoulder overall.       Objective: See treatment diary below      Assessment: Tolerated treatment well. Patient would benefit from continued PT. Able to progress strengthening with good tolerance.       Plan: Continue per plan of care.      Precautions: hx of R reverse TSA    Manuals 10/31 11/8 11/15 11/21 12/6 12/12 12/19      LV, scalene STM CB            R shoulder PROM  CB CB CB CB CB CB                                Neuro Re-Ed             Tband extension 10x10: 10x10: 10x10: BTB 2x10 BTB CB CB CB      Med ball reach   4# x15 4# x15 CB 4# 2x10 6# 2x10      Sammy forward press   2x10 2x10 15# CB 15# 2x10 CB      SL ER nv 2# 2x10 CB 2x10 2x10 GTB CB 2x10 GTB      Bicep curl nv   10# x10 CB CB X10, x4 10:      Tricep extension    10# sammy 2x10 CB CB 12.5# 2x10      Bent over row    Sammy 2x10 12# CB CB 3x10 12#      Ther Ex             Flexion table slides reviewed 10x10:  home         ER table stretch reviewed 10x10:  home         Scalene towel stretch nv nv           Supine dumbell press  2# 2x10 CB CB 2x10 6# CB CB                                                          Ther Activity                                       Gait Training                                       Modalities

## 2023-12-27 ENCOUNTER — OFFICE VISIT (OUTPATIENT)
Dept: PHYSICAL THERAPY | Facility: CLINIC | Age: 68
End: 2023-12-27

## 2023-12-27 DIAGNOSIS — Z96.611 STATUS POST REVERSE TOTAL REPLACEMENT OF RIGHT SHOULDER: Primary | ICD-10-CM

## 2023-12-27 PROCEDURE — 97112 NEUROMUSCULAR REEDUCATION: CPT | Performed by: PHYSICAL THERAPIST

## 2023-12-27 NOTE — PROGRESS NOTES
Daily Note     Today's date: 2023  Patient name: Kendal Clemente  : 1955  MRN: [id number]  Referring provider: Lucio Cottrell MD  Dx:   Encounter Diagnosis     ICD-10-CM    1. 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 She has been doing a little more recently.       Objective: See treatment diary below      Assessment: Tolerated treatment well. Patient would benefit from continued PT. Pt with some increased R sided neck stiffness, shoulder mobility is doing well.       Plan: Continue per plan of care.      Precautions: hx of R reverse TSA    Manuals 10/31 11/8 11/15 11/21 12/6 12/12 12/19 12/27     LV, scalene STM CB            R shoulder PROM  CB CB CB CB CB CB CB; R cervical STM                               Neuro Re-Ed             Tband extension 10x10: 10x10: 10x10: BTB 2x10 BTB CB CB CB CB     Med ball reach   4# x15 4# x15 CB 4# 2x10 6# 2x10 CB     [place] forward press   2x10 2x10 15# CB 15# 2x10 CB CB     SL ER nv 2# 2x10 CB 2x10 2x10 GTB CB 2x10 GTB 2x10 BTB     Bicep curl nv   10# x10 CB CB X10, x4 10:      Tricep extension    10# juan 2x10 CB CB 12.5# 2x10 CB     Bent over row    [place] 2x10 12# CB CB 3x10 12# CB     Ther Ex             Flexion table slides reviewed 10x10:  home         ER table stretch reviewed 10x10:  home         [place] towel stretch nv nv           Supine dumbell press  2# 2x10 CB CB 2x10 6# CB CB                                                          Ther Activity                                       Gait Training                                       Modalities

## 2024-01-03 ENCOUNTER — OFFICE VISIT (OUTPATIENT)
Dept: PHYSICAL THERAPY | Facility: CLINIC | Age: 69
End: 2024-01-03

## 2024-01-03 DIAGNOSIS — Z96.611 STATUS POST REVERSE TOTAL REPLACEMENT OF RIGHT SHOULDER: Primary | ICD-10-CM

## 2024-01-03 PROCEDURE — 97112 NEUROMUSCULAR REEDUCATION: CPT | Performed by: PHYSICAL THERAPIST

## 2024-01-03 NOTE — PROGRESS NOTES
Daily Note     Today's date: 1/3/2024  Patient name: Kendal Clemente  : 1955  MRN: [id number]  Referring provider: Lucio Cottrell MD  Dx:   Encounter Diagnosis     ICD-10-CM    1. 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      Objective: See treatment diary below      Assessment: Tolerated treatment well. Patient would benefit from continued PT. Continued improvement in strength/control. Minimal soreness post visit.       Plan: Continue per plan of care.      Precautions: hx of R reverse TSA    Manuals 10/31 11/8 11/15 11/21 12/6 12/12 12/19 12/27 1/3    LV, scalene STM CB            R shoulder PROM  CB CB CB CB CB CB CB; R cervical STM CB                              Neuro Re-Ed             Tband extension 10x10: 10x10: 10x10: BTB 2x10 BTB CB CB CB CB CB    Med ball reach   4# x15 4# x15 CB 4# 2x10 6# 2x10 CB CB    [place] forward press   2x10 2x10 15# CB 15# 2x10 CB CB 16.5# 2x10    SL ER nv 2# 2x10 CB 2x10 2x10 GTB CB 2x10 GTB 2x10 BTB 2x10 BTB    Bicep curl nv   10# x10 CB CB X10, x4 10:      Tricep extension    10# juan 2x10 CB CB 12.5# 2x10 CB CB    Bent over row    [place] 2x10 12# CB CB 3x10 12# CB CB    Tband IR         X10 BTB    Ther Ex             Flexion table slides reviewed 10x10:  home         ER table stretch reviewed 10x10:  home         [place] towel stretch nv nv           Supine dumbell press  2# 2x10 CB CB 2x10 6# CB CB  2x10 6#                                                        Ther Activity                                       Gait Training                                       Modalities

## 2024-01-10 ENCOUNTER — OFFICE VISIT (OUTPATIENT)
Dept: PHYSICAL THERAPY | Facility: CLINIC | Age: 69
End: 2024-01-10

## 2024-01-10 DIAGNOSIS — Z96.611 STATUS POST REVERSE TOTAL REPLACEMENT OF RIGHT SHOULDER: Primary | ICD-10-CM

## 2024-01-10 PROCEDURE — 97112 NEUROMUSCULAR REEDUCATION: CPT | Performed by: PHYSICAL THERAPIST

## 2024-01-10 NOTE — PROGRESS NOTES
Daily Note     Today's date: 1/10/2024  Patient name: Kendal Clemente  : 1955  MRN: [id number]  Referring provider: Lucio Cottrell MD  Dx:   Encounter Diagnosis     ICD-10-CM    1. 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       Objective: See treatment diary below      Assessment: Tolerated treatment well. Patient would benefit from continued PT. Pt with no increased complaints post session. She was able to progress strengthening well today.       Plan: Continue per plan of care.      Precautions: hx of R reverse TSA    Manuals 10/31 11/8 11/15 11/21 12/6 12/12 12/19 12/27 1/3 1/10   LV, scalene STM CB            R shoulder PROM  CB CB CB CB CB CB CB; R cervical STM CB CB                             Neuro Re-Ed             Tband extension 10x10: 10x10: 10x10: BTB 2x10 BTB CB CB CB CB CB CB   Med ball reach   4# x15 4# x15 CB 4# 2x10 6# 2x10 CB CB CB   [place] forward press   2x10 2x10 15# CB 15# 2x10 CB CB 16.5# 2x10 CB   SL ER nv 2# 2x10 CB 2x10 2x10 GTB CB 2x10 GTB 2x10 BTB 2x10 BTB CB   Bicep curl nv   10# x10 CB CB X10, x4 10:      Tricep extension    10# sammy 2x10 CB CB 12.5# 2x10 CB CB CB   Bent over row    [place] 2x10 12# CB CB 3x10 12# CB CB CB   Tband IR         X10 BTB CB   Ther Ex             Flexion table slides reviewed 10x10:  home         ER table stretch reviewed 10x10:  home         [place] towel stretch nv nv           Supine dumbell press  2# 2x10 CB CB 2x10 6# CB CB  2x10 6# CB                                                       Ther Activity                                       Gait Training                                       Modalities

## 2024-01-17 ENCOUNTER — APPOINTMENT (OUTPATIENT)
Dept: PHYSICAL THERAPY | Facility: CLINIC | Age: 69
End: 2024-01-17

## 2024-01-24 ENCOUNTER — OFFICE VISIT (OUTPATIENT)
Dept: PHYSICAL THERAPY | Facility: CLINIC | Age: 69
End: 2024-01-24

## 2024-01-24 DIAGNOSIS — Z96.611 STATUS POST REVERSE TOTAL REPLACEMENT OF RIGHT SHOULDER: Primary | ICD-10-CM

## 2024-01-24 PROCEDURE — 97112 NEUROMUSCULAR REEDUCATION: CPT | Performed by: PHYSICAL THERAPIST

## 2024-01-24 NOTE — PROGRESS NOTES
Daily Note     Today's date: 2024  Patient name: Kendal Clemente  : 1955  MRN: [id number]  Referring provider: Lucio Cottrell MD  Dx:   Encounter Diagnosis     ICD-10-CM    1. 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 She feels she has reached the point where she can manage independently through the HEP.       Objective: See treatment diary below      Assessment: Tolerated treatment well. Patient exhibited good technique with therapeutic exercises. Patient is appropriate to move to HEP only at this point and understands they may call in with any future questions/concerns.       Plan:  DC     Precautions: hx of R reverse TSA    Manuals 1/24 11/8 11/15 11/21 12/6 12/12 12/19 12/27 1/3 1/10   LV, scalene STM             R shoulder PROM  CB CB CB CB CB CB CB; R cervical STM CB CB                             Neuro Re-Ed             Tband extension CB 10x10: 10x10: BTB 2x10 BTB CB CB CB CB CB CB   Med ball reach CB  4# x15 4# x15 CB 4# 2x10 6# 2x10 CB CB CB   [place] forward press CB  2x10 2x10 15# CB 15# 2x10 CB CB 16.5# 2x10 CB   SL ER  2# 2x10 CB 2x10 2x10 GTB CB 2x10 GTB 2x10 BTB 2x10 BTB CB   Bicep curl X20 10#   10# x10 CB CB X10, x4 10:      Tricep extension CB   10# juan 2x10 CB CB 12.5# 2x10 CB CB CB   Bent over row CB   [place] 2x10 12# CB CB 3x10 12# CB CB CB   Tband IR nv        X10 BTB CB   Ther Ex             Flexion table slides  10x10:  home         ER table stretch  10x10:  home         [place] towel stretch  nv           Supine dumbell press CB 2# 2x10 CB CB 2x10 6# CB CB  2x10 6# CB                                                       Ther Activity                                       Gait Training                                       Modalities

## 2024-01-31 ENCOUNTER — APPOINTMENT (OUTPATIENT)
Dept: PHYSICAL THERAPY | Facility: CLINIC | Age: 69
End: 2024-01-31